# Patient Record
Sex: MALE | Race: BLACK OR AFRICAN AMERICAN | HISPANIC OR LATINO | Employment: FULL TIME | URBAN - METROPOLITAN AREA
[De-identification: names, ages, dates, MRNs, and addresses within clinical notes are randomized per-mention and may not be internally consistent; named-entity substitution may affect disease eponyms.]

---

## 2018-01-15 NOTE — MISCELLANEOUS
Provider Comments  Provider Comments:   3/30/16-TRIED CALLING PT  RE:MISSED APPT  TODAY   PHONE # IS NOT A WORKING # [phone]      Signatures   Electronically signed by : NOEL Arguello; Mar 30 2016  3:59PM EST                       (Author)    Electronically signed by : Krissy Diaz DO; Mar 30 2016  7:20PM EST                       (Author)

## 2018-06-06 ENCOUNTER — OFFICE VISIT (OUTPATIENT)
Dept: URGENT CARE | Facility: MEDICAL CENTER | Age: 23
End: 2018-06-06
Payer: COMMERCIAL

## 2018-06-06 VITALS
TEMPERATURE: 96.5 F | DIASTOLIC BLOOD PRESSURE: 80 MMHG | WEIGHT: 161 LBS | BODY MASS INDEX: 25.88 KG/M2 | HEART RATE: 75 BPM | RESPIRATION RATE: 18 BRPM | HEIGHT: 66 IN | OXYGEN SATURATION: 98 % | SYSTOLIC BLOOD PRESSURE: 119 MMHG

## 2018-06-06 DIAGNOSIS — J45.909 ASTHMA, UNSPECIFIED ASTHMA SEVERITY, UNSPECIFIED WHETHER COMPLICATED, UNSPECIFIED WHETHER PERSISTENT: Primary | ICD-10-CM

## 2018-06-06 PROCEDURE — S9088 SERVICES PROVIDED IN URGENT: HCPCS | Performed by: PHYSICIAN ASSISTANT

## 2018-06-06 PROCEDURE — 99203 OFFICE O/P NEW LOW 30 MIN: CPT | Performed by: PHYSICIAN ASSISTANT

## 2018-06-06 RX ORDER — ALBUTEROL SULFATE 90 UG/1
2 AEROSOL, METERED RESPIRATORY (INHALATION) EVERY 6 HOURS PRN
Qty: 18 G | Refills: 0 | Status: SHIPPED | OUTPATIENT
Start: 2018-06-06

## 2018-06-06 RX ORDER — LORATADINE 10 MG/1
10 CAPSULE, LIQUID FILLED ORAL
COMMUNITY

## 2018-06-06 RX ORDER — PREDNISONE 10 MG/1
TABLET ORAL
Qty: 21 TABLET | Refills: 0 | Status: SHIPPED | OUTPATIENT
Start: 2018-06-06

## 2018-06-06 RX ORDER — PREDNISONE 10 MG/1
TABLET ORAL
Qty: 21 TABLET | Refills: 0 | Status: SHIPPED | OUTPATIENT
Start: 2018-06-06 | End: 2018-06-06 | Stop reason: SDUPTHER

## 2018-06-06 RX ORDER — ALBUTEROL SULFATE 90 UG/1
2 AEROSOL, METERED RESPIRATORY (INHALATION) EVERY 6 HOURS PRN
Qty: 18 G | Refills: 0 | Status: SHIPPED | OUTPATIENT
Start: 2018-06-06 | End: 2018-06-06 | Stop reason: SDUPTHER

## 2018-06-06 RX ORDER — ALBUTEROL SULFATE 90 UG/1
2 AEROSOL, METERED RESPIRATORY (INHALATION) EVERY 6 HOURS PRN
COMMUNITY
End: 2018-06-06

## 2018-06-06 NOTE — PATIENT INSTRUCTIONS
Asthma   WHAT YOU NEED TO KNOW:   Asthma is a lung disease that makes breathing difficult  Chronic inflammation and reactions to triggers narrow the airways in the lungs  Asthma can become life-threatening if it is not managed  DISCHARGE INSTRUCTIONS:   Return to the emergency department if:   · You have severe shortness of breath  · Your lips or nails turn blue or gray  · The skin around your neck and ribs pulls in with each breath  · You have shortness of breath, even after you take your short-term medicine as directed  · Your peak flow numbers are in the red zone of your AAP  Contact your healthcare provider if:   · You run out of medicine before your next refill is due  · Your symptoms get worse  · You need to take more medicine than usual to control your symptoms  · You have questions or concerns about your condition or care  Medicines:   · Medicines  decrease inflammation, open airways, and make it easier to breathe  Medicines may be inhaled, taken as a pill, or injected  Short-term medicines relieve your symptoms quickly  Long-term medicines are used to prevent future attacks  You may also need medicine to help control your allergies  Ask your healthcare provider for more information about the medicine you are given and how to take it safely  · Take your medicine as directed  Contact your healthcare provider if you think your medicine is not helping or if you have side effects  Tell him of her if you are allergic to any medicine  Keep a list of the medicines, vitamins, and herbs you take  Include the amounts, and when and why you take them  Bring the list or the pill bottles to follow-up visits  Carry your medicine list with you in case of an emergency  Follow up with your healthcare provider as directed: You will need to return to make sure your medicine is working and your symptoms are controlled   You may be referred to an asthma specialist  Bright Lang may be asked to keep a record of your peak flow values and bring it with you to your appointments  Write down your questions so you remember to ask them during your visits  Manage your symptoms and prevent future attacks:   · Follow your Asthma Action Plan (AAP)  This is a written plan that you and your healthcare provider create  It explains which medicine you need and when to change doses if necessary  It also explains how you can monitor symptoms and use a peak flow meter  The meter measures how well your lungs are working  · Manage other health conditions , such as allergies, acid reflux, and sleep apnea  · Identify and avoid triggers  These may include pets, dust mites, mold, and cockroaches  · Do not smoke or be around others who smoke  Nicotine and other chemicals in cigarettes and cigars can cause lung damage  Ask your healthcare provider for information if you currently smoke and need help to quit  E-cigarettes or smokeless tobacco still contain nicotine  Talk to your healthcare provider before you use these products  · Ask about the flu vaccine  The flu can make your asthma worse  You may need a yearly flu shot  © 2017 [facility] Information is for End User's use only and may not be sold, redistributed or otherwise used for commercial purposes  All illustrations and images included in CareNotes® are the copyrighted property of [employer]  or [employer]  The above information is an  only  It is not intended as medical advice for individual conditions or treatments  Talk to your doctor, nurse or pharmacist before following any medical regimen to see if it is safe and effective for you

## 2018-06-06 NOTE — LETTER
June 6, 2018     Patient: Jorge Garcia   YOB: 1995   Date of Visit: 6/6/2018       To Whom It May Concern: It is my medical opinion that Jorge Garcia may return to work on 06/07/2018  If you have any questions or concerns, please don't hesitate to call           Sincerely,        Laura Jacobson PA-C    CC: No Recipients

## 2018-06-06 NOTE — PROGRESS NOTES
[facility]        NAME: Ashok Pinedo is a 20 y o  male  : 1995    MRN: [id number]  DATE: 2018  TIME: 9:11 AM    Assessment and Plan   Asthma, unspecified asthma severity, unspecified whether complicated, unspecified whether persistent [J45 909]  1  Asthma, unspecified asthma severity, unspecified whether complicated, unspecified whether persistent  predniSONE 10 mg tablet    albuterol (PROVENTIL HFA,VENTOLIN HFA) 90 mcg/act inhaler    DISCONTINUED: albuterol (PROVENTIL HFA,VENTOLIN HFA) 90 mcg/act inhaler    DISCONTINUED: predniSONE 10 mg tablet         Patient Instructions     Use inhaler and prednisone as directed  Patient may also take OTC antihistamine medications such as zyrtec, allegra, Claritin  Patient Instructions     Asthma   WHAT YOU NEED TO KNOW:   Asthma is a lung disease that makes breathing difficult  Chronic inflammation and reactions to triggers narrow the airways in the lungs  Asthma can become life-threatening if it is not managed  DISCHARGE INSTRUCTIONS:   Return to the emergency department if:   · You have severe shortness of breath  · Your lips or nails turn blue or gray  · The skin around your neck and ribs pulls in with each breath  · You have shortness of breath, even after you take your short-term medicine as directed  · Your peak flow numbers are in the red zone of your AAP  Contact your healthcare provider if:   · You run out of medicine before your next refill is due  · Your symptoms get worse  · You need to take more medicine than usual to control your symptoms  · You have questions or concerns about your condition or care  Medicines:   · Medicines  decrease inflammation, open airways, and make it easier to breathe  Medicines may be inhaled, taken as a pill, or injected  Short-term medicines relieve your symptoms quickly  Long-term medicines are used to prevent future attacks   You may also need medicine to help control your allergies  Ask your healthcare provider for more information about the medicine you are given and how to take it safely  · Take your medicine as directed  Contact your healthcare provider if you think your medicine is not helping or if you have side effects  Tell him of her if you are allergic to any medicine  Keep a list of the medicines, vitamins, and herbs you take  Include the amounts, and when and why you take them  Bring the list or the pill bottles to follow-up visits  Carry your medicine list with you in case of an emergency  Follow up with your healthcare provider as directed: You will need to return to make sure your medicine is working and your symptoms are controlled  You may be referred to an asthma specialist  Erica Kelly may be asked to keep a record of your peak flow values and bring it with you to your appointments  Write down your questions so you remember to ask them during your visits  Manage your symptoms and prevent future attacks:   · Follow your Asthma Action Plan (AAP)  This is a written plan that you and your healthcare provider create  It explains which medicine you need and when to change doses if necessary  It also explains how you can monitor symptoms and use a peak flow meter  The meter measures how well your lungs are working  · Manage other health conditions , such as allergies, acid reflux, and sleep apnea  · Identify and avoid triggers  These may include pets, dust mites, mold, and cockroaches  · Do not smoke or be around others who smoke  Nicotine and other chemicals in cigarettes and cigars can cause lung damage  Ask your healthcare provider for information if you currently smoke and need help to quit  E-cigarettes or smokeless tobacco still contain nicotine  Talk to your healthcare provider before you use these products  · Ask about the flu vaccine  The flu can make your asthma worse  You may need a yearly flu shot    © 2017 [employer] Information is for End User's use only and may not be sold, redistributed or otherwise used for commercial purposes  All illustrations and images included in CareNotes® are the copyrighted property of [employer]  or [employer]  The above information is an  only  It is not intended as medical advice for individual conditions or treatments  Talk to your doctor, nurse or pharmacist before following any medical regimen to see if it is safe and effective for you  Chief Complaint     Chief Complaint   Patient presents with    Cough     Patient relates started with wheezing x2 days  + cough and congestion  Denies fever  Hx  of asthma  Using Ventolin inhaler ran out last night  History of Present Illness   Ashok Pinedo presents to the clinic c/o       68-year-old male presents for evaluation of his asthma exacerbation since yesterday  Patient states he only has asthma exacerbations with seasonal changes which usually  Uses an asthma rescue inhaler  Only uses 1-2 X a year when asthma is bad with a change of season  He used it 6 x inhaler yesterday, last dose around 9 PM  This am his non productive cough and wheezing were still there  He no longer has a has been held because it ran out  He denies any respiratory distress syndromes  Denies any fever or chills  Review of Systems   Review of Systems   Constitutional: Negative for fever  Respiratory: Positive for cough, shortness of breath and wheezing  Cardiovascular: Negative            Current Medications     Long-Term Prescriptions   Medication Sig Dispense Refill    Loratadine 10 MG CAPS Take 10 mg by mouth         Current Allergies     Allergies as of 06/06/2018    (No Known Allergies)            The following portions of the patient's history were reviewed and updated as appropriate: allergies, current medications, past family history, past medical history, past social history, past surgical history and problem list     Objective   /80   Pulse 75   Temp (!) 96 5 °F (35 8 °C) (Tympanic)   Resp 18   Ht 5' 6" (1 676 m)   Wt 73 kg (161 lb)   SpO2 98%   BMI 25 99 kg/m²        Physical Exam     Physical Exam   Constitutional: He appears well-developed and well-nourished  No distress  Eyes: Conjunctivae and EOM are normal  Pupils are equal, round, and reactive to light  Right eye exhibits no discharge  Left eye exhibits no discharge  Neck: Normal range of motion  Neck supple  No tracheal deviation present  No thyromegaly present  Cardiovascular: Normal rate, regular rhythm and normal heart sounds  Exam reveals no gallop and no friction rub  No murmur heard  Pulmonary/Chest: Effort normal  No respiratory distress  He has wheezes (b/l wheezing diffusly heard on inspiration)  He has no rales  Musculoskeletal: Normal range of motion  Lymphadenopathy:     He has no cervical adenopathy  Skin: He is not diaphoretic  Nursing note and vitals reviewed

## 2018-08-30 ENCOUNTER — TRANSCRIBE ORDERS (OUTPATIENT)
Dept: URGENT CARE | Facility: MEDICAL CENTER | Age: 23
End: 2018-08-30

## 2018-08-30 ENCOUNTER — OFFICE VISIT (OUTPATIENT)
Dept: URGENT CARE | Facility: MEDICAL CENTER | Age: 23
End: 2018-08-30
Payer: COMMERCIAL

## 2018-08-30 ENCOUNTER — APPOINTMENT (OUTPATIENT)
Dept: RADIOLOGY | Facility: MEDICAL CENTER | Age: 23
End: 2018-08-30
Payer: COMMERCIAL

## 2018-08-30 VITALS
HEIGHT: 70 IN | SYSTOLIC BLOOD PRESSURE: 115 MMHG | TEMPERATURE: 97.4 F | HEART RATE: 72 BPM | BODY MASS INDEX: 22.19 KG/M2 | DIASTOLIC BLOOD PRESSURE: 65 MMHG | WEIGHT: 155 LBS | RESPIRATION RATE: 18 BRPM | OXYGEN SATURATION: 99 %

## 2018-08-30 DIAGNOSIS — M25.511 CHRONIC RIGHT SHOULDER PAIN: ICD-10-CM

## 2018-08-30 DIAGNOSIS — G89.29 CHRONIC RIGHT SHOULDER PAIN: Primary | ICD-10-CM

## 2018-08-30 DIAGNOSIS — M25.512 CHRONIC LEFT SHOULDER PAIN: ICD-10-CM

## 2018-08-30 DIAGNOSIS — G89.29 CHRONIC RIGHT SHOULDER PAIN: ICD-10-CM

## 2018-08-30 DIAGNOSIS — G89.29 CHRONIC LEFT SHOULDER PAIN: ICD-10-CM

## 2018-08-30 DIAGNOSIS — M25.511 CHRONIC RIGHT SHOULDER PAIN: Primary | ICD-10-CM

## 2018-08-30 PROCEDURE — S9088 SERVICES PROVIDED IN URGENT: HCPCS | Performed by: FAMILY MEDICINE

## 2018-08-30 PROCEDURE — 99213 OFFICE O/P EST LOW 20 MIN: CPT | Performed by: FAMILY MEDICINE

## 2018-08-30 PROCEDURE — 73030 X-RAY EXAM OF SHOULDER: CPT

## 2018-08-30 NOTE — PATIENT INSTRUCTIONS
X-ray of right and left shoulder reveals no fracture subluxation  History and physical suggest impingement of left shoulder greater than right  Patient advised to apply heat to affected area, continue with Advil as needed, he is to perform light stretching exercises for bilateral shoulder  Patient also given a list of primary care provider can look for to get established  Rotator Cuff Injury   WHAT YOU NEED TO KNOW:   A rotator cuff injury is damage to the muscles or tendons of your rotator cuff  The rotator cuff is made up of a group of muscles and tendons that hold the shoulder joint in place  The damage may include stretching of your muscle or tears in the tendons  It may also include inflammation of the bursa (small sack of fluid around the joint)  DISCHARGE INSTRUCTIONS:   · Acetaminophen: This medicine decreases pain  Acetaminophen is available without a doctor's order  Ask how much to take and how often to take it  Follow directions  Acetaminophen can cause liver damage if not taken correctly  · NSAIDs:  These medicines decrease swelling, pain, and fever  NSAIDs are available without a doctor's order  Ask your healthcare provider which medicine is right for you  Ask how much to take and when to take it  Take as directed  NSAIDs can cause stomach bleeding or kidney problems if not taken correctly  · Pain medicine: You may be given a prescription medicine to decrease pain  Do not wait until the pain is severe before you take this medicine  · Steroids: This medicine may be injected into the rotator cuff area to decrease inflammation and pain  · Take your medicine as directed  Contact your healthcare provider if you think your medicine is not helping or if you have side effects  Tell him of her if you are allergic to any medicine  Keep a list of the medicines, vitamins, and herbs you take  Include the amounts, and when and why you take them   Bring the list or the pill bottles to follow-up visits  Carry your medicine list with you in case of an emergency  Follow up with your healthcare provider or orthopedist as directed:  Write down your questions so you remember to ask them during your visits  Physical therapy:  A physical therapist can teach you exercises to help improve movement and strength, and to decrease pain  These exercises may help you go back to your usual activities or return to playing sports  Self-care:   · Rest:  Rest may help your shoulder heal  Overuse of your shoulder can make your injury worse  Avoid heavy lifting, using your arms over your head, or any other activity that makes the pain worse  · Put ice or heat on your shoulder:  Use ice on your shoulder every few hours for the first several days  This may help decrease pain and swelling  After the first several days, a heating pad may help relax the muscles in your shoulder  Contact your healthcare provider or orthopedist if:   · The pain in your shoulder or arm is not improving, or is worse than before you started treatment  · You have new pain in your neck  · You have a fever  · You have questions or concerns about your condition or care  Return to the emergency department if:  · You suddenly cannot move your arm  · You have severe stomach or back pain, are vomiting blood, or have black bowel movements  © 2017 [facility] Information is for End User's use only and may not be sold, redistributed or otherwise used for commercial purposes  All illustrations and images included in CareNotes® are the copyrighted property of [employer]  or [employer]  The above information is an  only  It is not intended as medical advice for individual conditions or treatments  Talk to your doctor, nurse or pharmacist before following any medical regimen to see if it is safe and effective for you

## 2018-08-30 NOTE — LETTER
August 30, 2018     Patient: Rosey Feliz   YOB: 1995   Date of Visit: 8/30/2018       To Whom It May Concern: It is my medical opinion that Rosey Feliz may return to work on 8/31/2018  If you have any questions or concerns, please don't hesitate to call           Sincerely,        Conchis Crenshaw MD    CC: No Recipients
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
## 2019-10-27 ENCOUNTER — APPOINTMENT (EMERGENCY)
Dept: RADIOLOGY | Facility: HOSPITAL | Age: 24
End: 2019-10-27
Payer: MEDICARE

## 2019-10-27 ENCOUNTER — HOSPITAL ENCOUNTER (EMERGENCY)
Facility: HOSPITAL | Age: 24
Discharge: HOME | End: 2019-10-27
Attending: EMERGENCY MEDICINE
Payer: MEDICARE

## 2019-10-27 VITALS
RESPIRATION RATE: 18 BRPM | OXYGEN SATURATION: 98 % | HEART RATE: 93 BPM | TEMPERATURE: 98.8 F | SYSTOLIC BLOOD PRESSURE: 149 MMHG | WEIGHT: 187 LBS | DIASTOLIC BLOOD PRESSURE: 85 MMHG | BODY MASS INDEX: 26.83 KG/M2

## 2019-10-27 DIAGNOSIS — S62.306A UNSPECIFIED FRACTURE OF FIFTH METACARPAL BONE, RIGHT HAND, INITIAL ENCOUNTER FOR CLOSED FRACTURE: Primary | ICD-10-CM

## 2019-10-27 PROCEDURE — 73140 X-RAY EXAM OF FINGER(S): CPT

## 2019-10-27 PROCEDURE — 99284 EMERGENCY DEPT VISIT MOD MDM: CPT | Performed by: EMERGENCY MEDICINE

## 2019-10-27 PROCEDURE — 99283 EMERGENCY DEPT VISIT LOW MDM: CPT

## 2019-10-28 NOTE — ED PROVIDER NOTES
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 Taking? Loratadine 10 MG CAPS   Yes No   Sig: Take 10 mg by mouth   albuterol (PROVENTIL HFA,VENTOLIN HFA) 90 mcg/act inhaler   No No   Sig: Inhale 2 puffs every 6 (six) hours as needed for wheezing or shortness of breath   predniSONE 10 mg tablet   No No   Sig: Day 1: take 6; day 2: take 5; day 3: take 4; day 4: take 3; day 5: take 2; Day 6: take 1 with food      Facility-Administered Medications: None       Past Medical History:   Diagnosis Date    Asthma        History reviewed  No pertinent surgical history  History reviewed  No pertinent family history  I have reviewed and agree with the history as documented  Social History     Tobacco Use    Smoking status: Never Smoker    Smokeless tobacco: Never Used   Substance Use Topics    Alcohol use: No    Drug use: No        Review of Systems   Constitutional: Negative for chills and fever  HENT: Negative for congestion and sore throat  Eyes: Negative for visual disturbance  Respiratory: Negative for cough and shortness of breath  Cardiovascular: Negative for chest pain and palpitations     Gastrointestinal: Negative for abdominal pain, diarrhea, nausea and vomiting  Genitourinary: Negative for difficulty urinating and dysuria  Musculoskeletal: Positive for arthralgias and joint swelling  Negative for myalgias  Skin: Negative for rash  Neurological: Negative for weakness, light-headedness, numbness and headaches  Physical Exam  ED Triage Vitals [10/27/19 1947]   Temperature Pulse Respirations Blood Pressure SpO2   98 8 °F (37 1 °C) 93 18 149/85 98 %      Temp Source Heart Rate Source Patient Position - Orthostatic VS BP Location FiO2 (%)   Oral Monitor Sitting Right arm --      Pain Score       --             Orthostatic Vital Signs  Vitals:    10/27/19 1947   BP: 149/85   Pulse: 93   Patient Position - Orthostatic VS: Sitting       Physical Exam   Constitutional: He is oriented to person, place, and time  He appears well-developed and well-nourished  HENT:   Head: Normocephalic and atraumatic  Mouth/Throat: Oropharynx is clear and moist    Eyes: Pupils are equal, round, and reactive to light  EOM are normal    Neck: Normal range of motion  Neck supple  Cardiovascular: Normal rate, regular rhythm and intact distal pulses  Pulmonary/Chest: No respiratory distress  Musculoskeletal: Normal range of motion  He exhibits no tenderness or deformity  Firm, nontender, non mobile nodule at the MCP of the right pinky finger  Normal flexion and extension at the joint to the pinky finger  Normal flexion extension at the wrist   No overlying erythema  Neurological: He is alert and oriented to person, place, and time  Coordination normal    Skin: Skin is warm and dry  Psychiatric: He has a normal mood and affect  His behavior is normal    Nursing note and vitals reviewed        ED Medications  Medications - No data to display    Diagnostic Studies  Results Reviewed     None                 XR finger fifth digit-pinkie LEFT    (Results Pending)         Procedures  Splint application  Date/Time: 10/27/2019 9:16 PM  Performed by: Ignacio Kwok MD  Authorized by: Ignacio Kwok MD     Patient location:  ED  Consent:     Consent obtained:  Verbal    Consent given by:  Patient    Risks discussed:  Discoloration, numbness, pain and swelling    Alternatives discussed:  No treatment and alternative treatment  Universal protocol:     Procedure explained and questions answered to patient or proxy's satisfaction: yes      Relevant documents present and verified: yes      Test results available and properly labeled: yes      Radiology Images displayed and confirmed  If images not available, report reviewed: yes      Required blood products, implants, devices, and special equipment available: yes      Site/side marked: yes      Immediately prior to procedure a time out was called: yes      Patient identity confirmed:  Verbally with patient  Indication:     Indications: fracture    Pre-procedure details:     Sensation:  Normal  Procedure details:     Laterality:  Right    Location:  Finger    Finger:  R small finger    Supplies:  Aluminum splint  Post-procedure details:     Pain:  Unchanged    Sensation:  Normal    Neurovascular Exam: skin pink, capillary refill <2 sec, normal pulses and skin intact, warm, and dry      Patient tolerance of procedure: Tolerated well, no immediate complications            ED Course                               MDM  Number of Diagnoses or Management Options  Diagnosis management comments: 57-year-old male presenting emergency department for evaluation of a nodule at the MCP of the right pinky finger  Appears to have a small chip fracture on x-ray in the emergency room  Unknown etiologies he denies any history of trauma  Very mild symptoms with intact range of motion and strength  Placed in an aluminum splint in the ER  Recommend hand surgery follow-up        Disposition  Final diagnoses:   Unspecified fracture of fifth metacarpal bone, right hand, initial encounter for closed fracture     Time reflects when diagnosis was documented in both MDM as applicable and the Disposition within this note     Time User Action Codes Description Comment    10/27/2019  9:18 PM Florin Joanne Jerzy Add [W25 [id number]] Unspecified fracture of fifth metacarpal bone, right hand, initial encounter for closed fracture       ED Disposition     ED Disposition Condition Date/Time Comment    Discharge Stable Sun Oct 27, 2019  9:14 PM Mirna Pacheco discharge to home/self care  Follow-up Information     Follow up With Specialties Details Why Contact Info Additional Information     [facility]   [address] [id number]  [phone] [address] [place] [address], [address] [place] [address] I , [place], [place], [address]          Discharge Medication List as of 10/27/2019  9:19 PM      CONTINUE these medications which have NOT CHANGED    Details   albuterol (PROVENTIL HFA,VENTOLIN HFA) 90 mcg/act inhaler Inhale 2 puffs every 6 (six) hours as needed for wheezing or shortness of breath, Starting Wed 6/6/2018, Normal      Loratadine 10 MG CAPS Take 10 mg by mouth, Historical Med      predniSONE 10 mg tablet Day 1: take 6; day 2: take 5; day 3: take 4; day 4: take 3; day 5: take 2; Day 6: take 1 with food, Normal           No discharge procedures on file  ED Provider  Attending physically available and evaluated Mirna Pacheco I managed the patient along with the ED Attending      Electronically Signed by         Gene Mcdaniel MD  10/28/19 0002

## 2019-10-28 NOTE — ED ATTENDING ATTESTATION
10/27/2019  I, Sandra Reyna MD, saw and evaluated the patient  I have discussed the patient with the resident/non-physician practitioner and agree with the resident's/non-physician practitioner's findings, Plan of Care, and MDM as documented in the resident's/non-physician practitioner's note, except where noted  All available labs and Radiology studies were reviewed  I was present for key portions of any procedure(s) performed by the resident/non-physician practitioner and I was immediately available to provide assistance  At this point I agree with the current assessment done in the Emergency Department  I have conducted an independent evaluation of this patient a history and physical is as follows:    ED Course         Critical Care Time  Procedures    29 yo male with right 5th digit metacarpal pain and swelling for last three weeks  No trauma, but pt is a  and uses hands a lot  No numbness, tingling, weakness  No pmh  Vss, afebrile, lungs cta, rrr, right 5th metacarpal with swelling, tenderness, nvi  Xray with small chip fx  Pt splinted, hand follow up

## 2020-02-21 ENCOUNTER — APPOINTMENT (EMERGENCY)
Dept: RADIOLOGY | Facility: HOSPITAL | Age: 25
End: 2020-02-21
Payer: MEDICARE

## 2020-02-21 ENCOUNTER — HOSPITAL ENCOUNTER (EMERGENCY)
Facility: HOSPITAL | Age: 25
Discharge: HOME | End: 2020-02-21
Attending: EMERGENCY MEDICINE | Admitting: EMERGENCY MEDICINE
Payer: MEDICARE

## 2020-02-21 VITALS
HEART RATE: 90 BPM | HEIGHT: 70 IN | DIASTOLIC BLOOD PRESSURE: 76 MMHG | WEIGHT: 182 LBS | BODY MASS INDEX: 26.05 KG/M2 | TEMPERATURE: 97.7 F | OXYGEN SATURATION: 100 % | SYSTOLIC BLOOD PRESSURE: 128 MMHG | RESPIRATION RATE: 18 BRPM

## 2020-02-21 DIAGNOSIS — S29.019A THORACIC MYOFASCIAL STRAIN, INITIAL ENCOUNTER: Primary | ICD-10-CM

## 2020-02-21 DIAGNOSIS — S39.012A ACUTE MYOFASCIAL STRAIN OF LUMBOSACRAL REGION, INITIAL ENCOUNTER: ICD-10-CM

## 2020-02-21 PROCEDURE — 72100 X-RAY EXAM L-S SPINE 2/3 VWS: CPT

## 2020-02-21 PROCEDURE — 72070 X-RAY EXAM THORAC SPINE 2VWS: CPT

## 2020-02-21 PROCEDURE — 99284 EMERGENCY DEPT VISIT MOD MDM: CPT | Performed by: PHYSICIAN ASSISTANT

## 2020-02-21 PROCEDURE — 99283 EMERGENCY DEPT VISIT LOW MDM: CPT

## 2020-02-21 RX ORDER — CYCLOBENZAPRINE HCL 10 MG
10 TABLET ORAL ONCE
Status: COMPLETED | OUTPATIENT
Start: 2020-02-21 | End: 2020-02-21

## 2020-02-21 RX ORDER — IBUPROFEN 400 MG/1
400 TABLET ORAL ONCE
Status: COMPLETED | OUTPATIENT
Start: 2020-02-21 | End: 2020-02-21

## 2020-02-21 RX ORDER — CYCLOBENZAPRINE HCL 10 MG
10 TABLET ORAL 3 TIMES DAILY PRN
Qty: 9 TABLET | Refills: 0 | Status: SHIPPED | OUTPATIENT
Start: 2020-02-21 | End: 2020-03-01

## 2020-02-21 RX ORDER — IBUPROFEN 400 MG/1
400 TABLET ORAL EVERY 6 HOURS PRN
Qty: 15 TABLET | Refills: 0 | Status: SHIPPED | OUTPATIENT
Start: 2020-02-21

## 2020-02-21 RX ADMIN — IBUPROFEN 400 MG: 400 TABLET ORAL at 20:58

## 2020-02-21 RX ADMIN — CYCLOBENZAPRINE 10 MG: 10 TABLET, FILM COATED ORAL at 20:58
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
## 2020-02-22 NOTE — ED NOTES
Patient transported to [address] [place] [address], [address] [place] [address]  02/21/20 2026

## 2020-10-11 ENCOUNTER — OFFICE VISIT (OUTPATIENT)
Dept: URGENT CARE | Age: 25
End: 2020-10-11
Payer: MEDICARE

## 2020-10-11 VITALS
DIASTOLIC BLOOD PRESSURE: 60 MMHG | TEMPERATURE: 99.7 F | HEART RATE: 106 BPM | WEIGHT: 151.8 LBS | OXYGEN SATURATION: 95 % | HEIGHT: 70 IN | SYSTOLIC BLOOD PRESSURE: 111 MMHG | BODY MASS INDEX: 21.73 KG/M2 | RESPIRATION RATE: 20 BRPM

## 2020-10-11 DIAGNOSIS — J45.901 ASTHMA WITH ACUTE EXACERBATION, UNSPECIFIED ASTHMA SEVERITY, UNSPECIFIED WHETHER PERSISTENT: Primary | ICD-10-CM

## 2020-10-11 PROCEDURE — 99213 OFFICE O/P EST LOW 20 MIN: CPT | Performed by: NURSE PRACTITIONER

## 2020-10-11 PROCEDURE — 96372 THER/PROPH/DIAG INJ SC/IM: CPT | Performed by: NURSE PRACTITIONER

## 2020-10-11 RX ORDER — PREDNISONE 20 MG/1
TABLET ORAL
Qty: 10 TABLET | Refills: 0 | Status: SHIPPED | OUTPATIENT
Start: 2020-10-11

## 2020-10-11 RX ORDER — ALBUTEROL SULFATE 90 UG/1
2 AEROSOL, METERED RESPIRATORY (INHALATION) EVERY 6 HOURS PRN
Qty: 18 G | Refills: 0 | Status: SHIPPED | OUTPATIENT
Start: 2020-10-11

## 2020-10-11 RX ADMIN — Medication 10 MG: at 13:42

## 2022-12-30 ENCOUNTER — HOSPITAL ENCOUNTER (EMERGENCY)
Facility: HOSPITAL | Age: 27
End: 2022-12-31
Attending: EMERGENCY MEDICINE

## 2022-12-30 VITALS
TEMPERATURE: 98.3 F | HEART RATE: 85 BPM | OXYGEN SATURATION: 98 % | SYSTOLIC BLOOD PRESSURE: 124 MMHG | DIASTOLIC BLOOD PRESSURE: 69 MMHG | RESPIRATION RATE: 20 BRPM

## 2022-12-30 DIAGNOSIS — F32.A DEPRESSION: ICD-10-CM

## 2022-12-30 DIAGNOSIS — F32.A DEPRESSION, UNSPECIFIED DEPRESSION TYPE: Primary | ICD-10-CM

## 2022-12-30 DIAGNOSIS — F22 PARANOIA (HCC): Primary | ICD-10-CM

## 2022-12-30 LAB
AMPHETAMINES SERPL QL SCN: POSITIVE
BARBITURATES UR QL: NEGATIVE
BENZODIAZ UR QL: NEGATIVE
COCAINE UR QL: NEGATIVE
ETHANOL EXG-MCNC: 0 MG/DL
FLUAV RNA RESP QL NAA+PROBE: NEGATIVE
FLUBV RNA RESP QL NAA+PROBE: NEGATIVE
METHADONE UR QL: NEGATIVE
OPIATES UR QL SCN: NEGATIVE
OXYCODONE+OXYMORPHONE UR QL SCN: NEGATIVE
PCP UR QL: NEGATIVE
RSV RNA RESP QL NAA+PROBE: NEGATIVE
SARS-COV-2 RNA RESP QL NAA+PROBE: NEGATIVE
THC UR QL: POSITIVE

## 2022-12-30 RX ORDER — OLANZAPINE 2.5 MG/1
2.5 TABLET ORAL
Status: CANCELLED | OUTPATIENT
Start: 2022-12-30

## 2022-12-30 RX ORDER — IBUPROFEN 400 MG/1
400 TABLET ORAL EVERY 6 HOURS PRN
Status: CANCELLED | OUTPATIENT
Start: 2022-12-30

## 2022-12-30 RX ORDER — PROPRANOLOL HYDROCHLORIDE 10 MG/1
10 TABLET ORAL EVERY 8 HOURS PRN
Status: CANCELLED | OUTPATIENT
Start: 2022-12-30

## 2022-12-30 RX ORDER — LANOLIN ALCOHOL/MO/W.PET/CERES
3 CREAM (GRAM) TOPICAL
Status: CANCELLED | OUTPATIENT
Start: 2022-12-30

## 2022-12-30 RX ORDER — IBUPROFEN 600 MG/1
600 TABLET ORAL EVERY 8 HOURS PRN
Status: CANCELLED | OUTPATIENT
Start: 2022-12-30

## 2022-12-30 RX ORDER — MAGNESIUM HYDROXIDE/ALUMINUM HYDROXICE/SIMETHICONE 120; 1200; 1200 MG/30ML; MG/30ML; MG/30ML
30 SUSPENSION ORAL EVERY 4 HOURS PRN
Status: CANCELLED | OUTPATIENT
Start: 2022-12-30

## 2022-12-30 RX ORDER — BISACODYL 10 MG
10 SUPPOSITORY, RECTAL RECTAL DAILY PRN
Status: CANCELLED | OUTPATIENT
Start: 2022-12-30

## 2022-12-30 RX ORDER — OLANZAPINE 10 MG/1
10 INJECTION, POWDER, LYOPHILIZED, FOR SOLUTION INTRAMUSCULAR
Status: CANCELLED | OUTPATIENT
Start: 2022-12-30

## 2022-12-30 RX ORDER — AMOXICILLIN 250 MG
1 CAPSULE ORAL DAILY PRN
Status: CANCELLED | OUTPATIENT
Start: 2022-12-30

## 2022-12-30 RX ORDER — HYDROXYZINE 50 MG/1
50 TABLET, FILM COATED ORAL
Status: CANCELLED | OUTPATIENT
Start: 2022-12-30

## 2022-12-30 RX ORDER — MIRTAZAPINE 15 MG/1
7.5 TABLET, FILM COATED ORAL
Status: CANCELLED | OUTPATIENT
Start: 2022-12-30

## 2022-12-30 RX ORDER — OLANZAPINE 10 MG/1
5 INJECTION, POWDER, LYOPHILIZED, FOR SOLUTION INTRAMUSCULAR
Status: CANCELLED | OUTPATIENT
Start: 2022-12-30

## 2022-12-30 RX ORDER — BENZTROPINE MESYLATE 1 MG/ML
1 INJECTION INTRAMUSCULAR; INTRAVENOUS
Status: CANCELLED | OUTPATIENT
Start: 2022-12-30

## 2022-12-30 RX ORDER — ACETAMINOPHEN 325 MG/1
650 TABLET ORAL EVERY 6 HOURS PRN
Status: CANCELLED | OUTPATIENT
Start: 2022-12-30

## 2022-12-30 RX ORDER — HYDROXYZINE HYDROCHLORIDE 25 MG/1
25 TABLET, FILM COATED ORAL
Status: CANCELLED | OUTPATIENT
Start: 2022-12-30

## 2022-12-30 RX ORDER — BENZTROPINE MESYLATE 1 MG/1
1 TABLET ORAL
Status: CANCELLED | OUTPATIENT
Start: 2022-12-30

## 2022-12-30 RX ORDER — HYDROXYZINE 50 MG/1
100 TABLET, FILM COATED ORAL
Status: CANCELLED | OUTPATIENT
Start: 2022-12-30

## 2022-12-30 RX ORDER — OLANZAPINE 5 MG/1
5 TABLET ORAL
Status: CANCELLED | OUTPATIENT
Start: 2022-12-30

## 2022-12-30 RX ORDER — POLYETHYLENE GLYCOL 3350 17 G/17G
17 POWDER, FOR SOLUTION ORAL DAILY PRN
Status: CANCELLED | OUTPATIENT
Start: 2022-12-30

## 2022-12-30 RX ORDER — LORAZEPAM 2 MG/ML
2 INJECTION INTRAMUSCULAR EVERY 6 HOURS PRN
Status: CANCELLED | OUTPATIENT
Start: 2022-12-30

## 2022-12-30 RX ORDER — DIPHENHYDRAMINE HYDROCHLORIDE 50 MG/ML
50 INJECTION INTRAMUSCULAR; INTRAVENOUS EVERY 6 HOURS PRN
Status: CANCELLED | OUTPATIENT
Start: 2022-12-30

## 2022-12-30 RX ORDER — OLANZAPINE 10 MG/1
10 TABLET ORAL
Status: CANCELLED | OUTPATIENT
Start: 2022-12-30
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
## 2022-12-30 NOTE — LETTER
[address] [facility] EMERGENCY DEPARTMENT  [facility] [address]  Dept: [phone]              EMTALA TRANSFER CONSENT    NAME Kia CASH 1995                              MRN [id number]    I have been informed of my rights regarding examination, treatment, and transfer   by Dr Lenore Fitzgerald,*    Benefits: Other benefits (Include comment)_______________________ (Inpt MH tx)    Risks: Potential for delay in receiving treatment      Consent for Transfer:  I acknowledge that my medical condition has been evaluated and explained to me by the emergency department physician or other qualified medical person and/or my attending physician, who has recommended that I be transferred to the service of  Accepting Physician: Dr Taqueria Pickens at [address] [facility] Name, [address] : [place], [facility]  The above potential benefits of such transfer, the potential risks associated with such transfer, and the probable risks of not being transferred have been explained to me, and I fully understand them  The doctor has explained that, in my case, the benefits of transfer outweigh the risks  I agree to be transferred  I authorize the performance of emergency medical procedures and treatments upon me in both transit and upon arrival at the receiving facility  Additionally, I authorize the release of any and all medical records to the receiving facility and request they be transported with me, if possible  I understand that the safest mode of transportation during a medical emergency is an ambulance and that the Hospital advocates the use of this mode of transport  Risks of traveling to the receiving facility by car, including absence of medical control, life sustaining equipment, such as oxygen, and medical personnel has been explained to me and I fully understand them      (BILLY CORRECT BOX BELOW)  [ X ]  I consent to the stated transfer and to be transported by ambulance/helicopter  [  ]  I consent to the stated transfer, but refuse transportation by ambulance and accept full responsibility for my transportation by car  I understand the risks of non-ambulance transfers and I exonerate the Hospital and its staff from any deterioration in my condition that results from this refusal     X___________________________________________    DATE  22  TIME________  Signature of patient or legally responsible individual signing on patient behalf           RELATIONSHIP TO PATIENT_________________________                      Provider Certification    NAME Beltran Diop                            1995                              MRN [id number]    A medical screening exam was performed on the above named patient  Based on the examination:    Condition Necessitating Transfer The primary encounter diagnosis was Paranoia ([address] )  A diagnosis of Depression was also pertinent to this visit  Patient Condition: The patient has been stabilized such that within reasonable medical probability, no material deterioration of the patient condition or the condition of the unborn child(elizabeth) is likely to result from the transfer    Reason for Transfer: Level of Care needed not available at this facility    Transfer Requirements: [facility] -[facility]    · Space available and qualified personnel available for treatment as acknowledged by ALFONZO Chisholm  · Agreed to accept transfer and to provide appropriate medical treatment as acknowledged by       Dr Bozena Duque  · Appropriate medical records of the examination and treatment of the patient are provided at the time of transfer   [facility], [address] ___DC____  · Transfer will be performed by qualified personnel from [address] [place] [address]  and appropriate transfer equipment as required, including the use of necessary and appropriate life support measures      Provider Certification: I have examined the patient and explained the following risks and benefits of being transferred/refusing transfer to the patient/family:  General risk, such as traffic hazards, adverse weather conditions, rough terrain or turbulence, possible failure of equipment (including vehicle or aircraft), or consequences of actions of persons outside the control of the transport personnel      Based on these reasonable risks and benefits to the patient and/or the unborn child(elizabeth), and based upon the information available at the time of the patient’s examination, I certify that the medical benefits reasonably to be expected from the provision of appropriate medical treatments at another medical facility outweigh the increasing risks, if any, to the individual’s medical condition, and in the case of labor to the unborn child, from effecting the transfer      X____________________________________________ DATE 12/30/22        TIME_______      ORIGINAL - SEND TO MEDICAL RECORDS   COPY - SEND WITH PATIENT DURING TRANSFER
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
## 2022-12-30 NOTE — ED PROVIDER NOTES
History  Chief Complaint   Patient presents with   • Psychiatric Evaluation     Brought in by EMS with police who would like to 302 due to chasing a boy who he thought was following him  Denies SI/HI/AH/VH but does admit to thinking someone was following him     Patient is a 32year old male with PMHx asthma, depression, "stress", presenting to the ED with police for evaluation of paranoia  Per police at bedside, patient had flagged her down on the road, believing that someone was chasing and following him  At that moment, another car came behind the police and a man got out and confronted the patient saying "are you the one that has been chasing my kid?" Per police who spoke to patient's mother, patient believes that there are people following him and out to get him  He chased a 13year old boy in the streets today, which is when police came upon him  Police spoke to patient's mother and mother states that patient moved in with her 4 months ago after patient lost his house and his car  Patient reportedly has been having delusions and episodes of paranoia  Reported 30 lb unintentional weight loss over the past 4 months  Questionable drug use  Police had contacted crisis services prior to arrival and was instructed to petition for a 302  On my evaluation, patient admits to feeling stressed and depressed lately, but unwilling to disclose further  He is calm and cooperative, recognizes that he needs help and is willing to sign in for voluntary treatment  Patient has no somatic complaints at this time other than hunger  States he has not eaten today  Patient denies suicidal ideation, homicidal ideation, hallucinations, command voices  Prior to Admission Medications   Prescriptions Last Dose Informant Patient Reported? Taking?    Loratadine 10 MG CAPS   Yes No   Sig: Take 10 mg by mouth   albuterol (PROVENTIL HFA,VENTOLIN HFA) 90 mcg/act inhaler   No No   Sig: Inhale 2 puffs every 6 (six) hours as needed for wheezing or shortness of breath   albuterol (Ventolin HFA) 90 mcg/act inhaler   No No   Sig: Inhale 2 puffs every 6 (six) hours as needed for wheezing   cyclobenzaprine (FLEXERIL) 10 mg tablet   No No   Sig: Take 1 tablet (10 mg total) by mouth 3 (three) times a day as needed for muscle spasms for up to 9 days   ibuprofen (MOTRIN) 400 mg tablet   No No   Sig: Take 1 tablet (400 mg total) by mouth every 6 (six) hours as needed for mild pain for up to 15 doses   predniSONE 10 mg tablet   No No   Sig: Day 1: take 6; day 2: take 5; day 3: take 4; day 4: take 3; day 5: take 2; Day 6: take 1 with food   Patient not taking: Reported on 10/11/2020   predniSONE 20 mg tablet   No No   Si tab TID x 3 days, then BIX 3 3 days, then daily x 3 days      Facility-Administered Medications: None       Past Medical History:   Diagnosis Date   • Asthma        History reviewed  No pertinent surgical history  History reviewed  No pertinent family history  I have reviewed and agree with the history as documented  E-Cigarette/Vaping     E-Cigarette/Vaping Substances     Social History     Tobacco Use   • Smoking status: Never   • Smokeless tobacco: Never   Substance Use Topics   • Alcohol use: No   • Drug use: Yes     Types: Marijuana        Review of Systems   Constitutional: Positive for unexpected weight change  Negative for chills and fever  HENT: Negative for ear pain and sore throat  Eyes: Negative for pain and visual disturbance  Respiratory: Negative for cough and shortness of breath  Cardiovascular: Negative for chest pain and palpitations  Gastrointestinal: Negative for abdominal pain and vomiting  Genitourinary: Negative for dysuria and hematuria  Musculoskeletal: Negative for arthralgias and back pain  Skin: Negative for color change and rash  Neurological: Negative for seizures and syncope  Psychiatric/Behavioral: Negative for suicidal ideas     All other systems reviewed and are negative  Physical Exam  ED Triage Vitals [12/30/22 1553]   Temperature Pulse Respirations Blood Pressure SpO2   98 3 °F (36 8 °C) (!) 118 18 144/83 100 %      Temp Source Heart Rate Source Patient Position - Orthostatic VS BP Location FiO2 (%)   Oral Monitor -- Right arm --      Pain Score       --             Orthostatic Vital Signs  Vitals:    12/30/22 1553 12/30/22 2353   BP: 144/83 124/69   Pulse: (!) 118 85       Physical Exam  Vitals and nursing note reviewed  Constitutional:       General: He is not in acute distress  Appearance: Normal appearance  He is well-developed  HENT:      Head: Normocephalic and atraumatic  Right Ear: External ear normal       Left Ear: External ear normal       Nose: Nose normal  No congestion  Mouth/Throat:      Mouth: Mucous membranes are moist       Pharynx: Oropharynx is clear  No oropharyngeal exudate or posterior oropharyngeal erythema  Eyes:      General: No scleral icterus  Conjunctiva/sclera: Conjunctivae normal       Pupils: Pupils are equal, round, and reactive to light  Cardiovascular:      Rate and Rhythm: Normal rate and regular rhythm  Pulses: Normal pulses  Heart sounds: Normal heart sounds  No murmur heard  Pulmonary:      Effort: Pulmonary effort is normal  No respiratory distress  Breath sounds: Normal breath sounds  No wheezing, rhonchi or rales  Abdominal:      General: Abdomen is flat  Bowel sounds are normal       Palpations: Abdomen is soft  Tenderness: There is no abdominal tenderness  There is no guarding or rebound  Musculoskeletal:         General: No swelling  Normal range of motion  Cervical back: Normal range of motion and neck supple  No tenderness  Right lower leg: No edema  Left lower leg: No edema  Skin:     General: Skin is warm and dry  Capillary Refill: Capillary refill takes less than 2 seconds  Findings: No erythema or rash     Neurological:      General: No focal deficit present  Mental Status: He is alert and oriented to person, place, and time  Cranial Nerves: No cranial nerve deficit  Sensory: No sensory deficit  Motor: No weakness  Gait: Gait normal    Psychiatric:         Attention and Perception: Attention and perception normal          Mood and Affect: Mood normal  Mood is not anxious  Affect is flat  Speech: Speech normal          Behavior: Behavior is not agitated or aggressive  Thought Content: Thought content is paranoid (believes someone was chasing him earlier today)  Thought content does not include homicidal or suicidal ideation  Thought content does not include homicidal or suicidal plan  Cognition and Memory: Cognition and memory normal          ED Medications  Medications - No data to display    Diagnostic Studies  Results Reviewed     Procedure Component Value Units Date/Time    Rapid drug screen, urine [[id number]]  (Abnormal) Collected: 12/30/22 1852    Lab Status: Final result Specimen: Urine, Clean Catch Updated: 12/30/22 1917     Amph/Meth UR Positive     Barbiturate Ur Negative     Benzodiazepine Urine Negative     Cocaine Urine Negative     Methadone Urine Negative     Opiate Urine Negative     PCP Ur Negative     THC Urine Positive     Oxycodone Urine Negative    Narrative:      Presumptive report  If requested, specimen will be sent to reference lab for confirmation  FOR MEDICAL PURPOSES ONLY  IF CONFIRMATION NEEDED PLEASE CONTACT THE LAB WITHIN 5 DAYS      Drug Screen Cutoff Levels:  AMPHETAMINE/METHAMPHETAMINES  1000 ng/mL  BARBITURATES     200 ng/mL  BENZODIAZEPINES     200 ng/mL  COCAINE      300 ng/mL  METHADONE      300 ng/mL  OPIATES      300 ng/mL  PHENCYCLIDINE     25 ng/mL  THC       50 ng/mL  OXYCODONE      100 ng/mL    FLU/RSV/COVID - if FLU/RSV clinically relevant [[id number]]  (Normal) Collected: 12/30/22 1722    Lab Status: Final result Specimen: Nares from Nose Updated: 12/30/22 1827     SARS-CoV-2 Negative     INFLUENZA A PCR Negative     INFLUENZA B PCR Negative     RSV PCR Negative    Narrative:      FOR PEDIATRIC PATIENTS - copy/paste COVID Guidelines URL to browser: [email or web address]    SARS-CoV-2 assay is a Nucleic Acid Amplification assay intended for the  qualitative detection of nucleic acid from SARS-CoV-2 in nasopharyngeal  swabs  Results are for the presumptive identification of SARS-CoV-2 RNA  Positive results are indicative of infection with SARS-CoV-2, the virus  causing COVID-19, but do not rule out bacterial infection or co-infection  with other viruses  Laboratories within the [place] and its  territories are required to report all positive results to the appropriate  public health authorities  Negative results do not preclude SARS-CoV-2  infection and should not be used as the sole basis for treatment or other  patient management decisions  Negative results must be combined with  clinical observations, patient history, and epidemiological information  This test has not been FDA cleared or approved  This test has been authorized by FDA under an Emergency Use Authorization  (EUA)  This test is only authorized for the duration of time the  declaration that circumstances exist justifying the authorization of the  emergency use of an in vitro diagnostic tests for detection of SARS-CoV-2  virus and/or diagnosis of COVID-19 infection under section 564(b)(1) of  the Act, 21 U  S C  982DVQ-7(C)(5), unless the authorization is terminated  or revoked sooner  The test has been validated but independent review by FDA  and CLIA is pending  Test performed using [employer] GeneXpert: This RT-PCR assay targets N2,  a region unique to SARS-CoV-2  A conserved region in the E-gene was chosen  for pan-Sarbecovirus detection which includes SARS-CoV-2      According to CMS-2020-01-R, this platform meets the definition of high-throughput technology  POCT alcohol breath test [[id number]]  (Normal) Resulted: 12/30/22 1635    Lab Status: Final result Updated: 12/30/22 1635     EXTBreath Alcohol 0 000                 No orders to display         Procedures  Procedures      ED Course  ED Course as of 12/31/22 0033   Fri Dec 30, 2022   1800 Patient signed a 61 51 81 order for voluntary psychiatric admission  This is a 32year old male presenting to the ED with police for evaluation of paranoia after chasing a 13year old boy, believing that the boy was chasing him  Patient denies suicidal or homicidal ideation  ED crisis worker contacted and will evaluate   filling out 302 paperwork in case patient will not sign 201  On re-evaluation, patient resting comfortably  Calm and cooperative  Patient has been accepted to [facility] and transfer time estimated [phone]  Patient signed transfer consent  No other somatic complaints  MDM    Disposition  Final diagnoses:   Paranoia ([address] )   Depression     Time reflects when diagnosis was documented in both MDM as applicable and the Disposition within this note     Time User Action Codes Description Comment    12/30/2022  6:27 PM Jackie Gasmen Add [F22] Paranoia ([address] )     12/30/2022  6:27 PM Jackie Gasmen Add Beny [place]  A] Depression       ED Disposition     ED Disposition   Transfer to Behavioral Health    Condition   --    Date/Time   Fri Dec 30, 2022  6:27 PM    Comment   Edilia  has been medically cleared and can be transferred out to inpatient behavioral health             MD Documentation    [address] Most Recent Value   Patient Condition The patient has been stabilized such that within reasonable medical probability, no material deterioration of the patient condition or the condition of the unborn child(elizabeth) is likely to result from the transfer   Reason for Transfer Level of Care needed not available at this facility   Benefits of Transfer Other benefits (Include comment)_______________________  Rian [facility] MH tx]   Risks of Transfer Potential for delay in receiving treatment   Accepting Physician Dr Linn Bryant Name, [address], [place]    (Name & Tel number) Aramis Marshall, LSW   Transported by [employer] and Unit #) [address]   Sending MD Dr Leoncio Che   Provider Certification General risk, such as traffic hazards, adverse weather conditions, rough terrain or turbulence, possible failure of equipment (including vehicle or aircraft), or consequences of actions of persons outside the control of the transport personnel      RN Documentation    [address] Name, [address]  [place] [place]    (Name & Tel number) Aramis Marshall, EULOGIOW   Transported by [employer] and Unit #) [facility]      Follow-up Information    None         Discharge Medication List as of 12/31/2022 12:29 AM      CONTINUE these medications which have NOT CHANGED    Details   !! albuterol (PROVENTIL HFA,VENTOLIN HFA) 90 mcg/act inhaler Inhale 2 puffs every 6 (six) hours as needed for wheezing or shortness of breath, Starting Wed 6/6/2018, Normal      !! albuterol (Ventolin HFA) 90 mcg/act inhaler Inhale 2 puffs every 6 (six) hours as needed for wheezing, Starting Sun 10/11/2020, Normal      cyclobenzaprine (FLEXERIL) 10 mg tablet Take 1 tablet (10 mg total) by mouth 3 (three) times a day as needed for muscle spasms for up to 9 days, Starting Fri 2/21/2020, Until Sun 3/1/2020, Print      ibuprofen (MOTRIN) 400 mg tablet Take 1 tablet (400 mg total) by mouth every 6 (six) hours as needed for mild pain for up to 15 doses, Starting Fri 2/21/2020, Print      Loratadine 10 MG CAPS Take 10 mg by mouth, Historical Med      !! predniSONE 10 mg tablet Day 1: take 6; day 2: take 5; day 3: take 4; day 4: take 3; day 5: take 2; Day 6: take 1 with food, Normal      !! predniSONE 20 mg tablet 1 tab TID x 3 days, then BIX 3 3 days, then daily x 3 days, Normal       !! - Potential duplicate medications found  Please discuss with provider  No discharge procedures on file  PDMP Review     None           ED Provider  Attending physically available and evaluated Tete Harper I managed the patient along with the ED Attending      Electronically Signed by         Macario Webb DO  12/31/22 1056

## 2022-12-31 ENCOUNTER — HOSPITAL ENCOUNTER (INPATIENT)
Facility: HOSPITAL | Age: 27
LOS: 3 days | Discharge: HOME | End: 2023-01-03
Attending: STUDENT IN AN ORGANIZED HEALTH CARE EDUCATION/TRAINING PROGRAM | Admitting: GENERAL PRACTICE

## 2022-12-31 DIAGNOSIS — F32.A DEPRESSION, UNSPECIFIED DEPRESSION TYPE: ICD-10-CM

## 2022-12-31 PROBLEM — Z00.8 MEDICAL CLEARANCE FOR PSYCHIATRIC ADMISSION: Status: ACTIVE | Noted: 2022-12-31

## 2022-12-31 PROBLEM — F29 PSYCHOSIS (HCC): Status: ACTIVE | Noted: 2022-12-31

## 2022-12-31 RX ORDER — IBUPROFEN 400 MG/1
400 TABLET ORAL EVERY 6 HOURS PRN
Status: DISCONTINUED | OUTPATIENT
Start: 2022-12-31 | End: 2023-01-03 | Stop reason: HOSPADM

## 2022-12-31 RX ORDER — OLANZAPINE 5 MG/1
5 TABLET ORAL
Status: DISCONTINUED | OUTPATIENT
Start: 2022-12-31 | End: 2023-01-03 | Stop reason: HOSPADM

## 2022-12-31 RX ORDER — DIPHENHYDRAMINE HYDROCHLORIDE 50 MG/ML
50 INJECTION INTRAMUSCULAR; INTRAVENOUS EVERY 6 HOURS PRN
Status: DISCONTINUED | OUTPATIENT
Start: 2022-12-31 | End: 2023-01-03 | Stop reason: HOSPADM

## 2022-12-31 RX ORDER — POLYETHYLENE GLYCOL 3350 17 G/17G
17 POWDER, FOR SOLUTION ORAL DAILY PRN
Status: DISCONTINUED | OUTPATIENT
Start: 2022-12-31 | End: 2023-01-03 | Stop reason: HOSPADM

## 2022-12-31 RX ORDER — OLANZAPINE 2.5 MG/1
2.5 TABLET ORAL
Status: DISCONTINUED | OUTPATIENT
Start: 2022-12-31 | End: 2023-01-03 | Stop reason: HOSPADM

## 2022-12-31 RX ORDER — BENZTROPINE MESYLATE 1 MG/1
1 TABLET ORAL
Status: DISCONTINUED | OUTPATIENT
Start: 2022-12-31 | End: 2023-01-03 | Stop reason: HOSPADM

## 2022-12-31 RX ORDER — BENZTROPINE MESYLATE 1 MG/ML
1 INJECTION INTRAMUSCULAR; INTRAVENOUS
Status: DISCONTINUED | OUTPATIENT
Start: 2022-12-31 | End: 2023-01-03 | Stop reason: HOSPADM

## 2022-12-31 RX ORDER — LORAZEPAM 2 MG/ML
2 INJECTION INTRAMUSCULAR EVERY 6 HOURS PRN
Status: DISCONTINUED | OUTPATIENT
Start: 2022-12-31 | End: 2023-01-03 | Stop reason: HOSPADM

## 2022-12-31 RX ORDER — BISACODYL 10 MG
10 SUPPOSITORY, RECTAL RECTAL DAILY PRN
Status: DISCONTINUED | OUTPATIENT
Start: 2022-12-31 | End: 2022-12-31

## 2022-12-31 RX ORDER — LANOLIN ALCOHOL/MO/W.PET/CERES
3 CREAM (GRAM) TOPICAL
Status: DISCONTINUED | OUTPATIENT
Start: 2022-12-31 | End: 2023-01-03 | Stop reason: HOSPADM

## 2022-12-31 RX ORDER — HYDROXYZINE 50 MG/1
100 TABLET, FILM COATED ORAL
Status: DISCONTINUED | OUTPATIENT
Start: 2022-12-31 | End: 2023-01-03 | Stop reason: HOSPADM

## 2022-12-31 RX ORDER — ACETAMINOPHEN 325 MG/1
650 TABLET ORAL EVERY 6 HOURS PRN
Status: DISCONTINUED | OUTPATIENT
Start: 2022-12-31 | End: 2023-01-03 | Stop reason: HOSPADM

## 2022-12-31 RX ORDER — HYDROXYZINE HYDROCHLORIDE 25 MG/1
25 TABLET, FILM COATED ORAL
Status: DISCONTINUED | OUTPATIENT
Start: 2022-12-31 | End: 2023-01-03 | Stop reason: HOSPADM

## 2022-12-31 RX ORDER — POLYETHYLENE GLYCOL 3350 17 G/17G
17 POWDER, FOR SOLUTION ORAL DAILY PRN
Status: DISCONTINUED | OUTPATIENT
Start: 2022-12-31 | End: 2022-12-31

## 2022-12-31 RX ORDER — AMOXICILLIN 250 MG
1 CAPSULE ORAL DAILY PRN
Status: DISCONTINUED | OUTPATIENT
Start: 2022-12-31 | End: 2023-01-03 | Stop reason: HOSPADM

## 2022-12-31 RX ORDER — OLANZAPINE 10 MG/1
10 INJECTION, POWDER, LYOPHILIZED, FOR SOLUTION INTRAMUSCULAR
Status: DISCONTINUED | OUTPATIENT
Start: 2022-12-31 | End: 2023-01-03 | Stop reason: HOSPADM

## 2022-12-31 RX ORDER — MIRTAZAPINE 7.5 MG/1
7.5 TABLET, FILM COATED ORAL
Status: DISCONTINUED | OUTPATIENT
Start: 2022-12-31 | End: 2023-01-03 | Stop reason: HOSPADM

## 2022-12-31 RX ORDER — HYDROXYZINE 50 MG/1
50 TABLET, FILM COATED ORAL
Status: DISCONTINUED | OUTPATIENT
Start: 2022-12-31 | End: 2023-01-03 | Stop reason: HOSPADM

## 2022-12-31 RX ORDER — ALBUTEROL SULFATE 90 UG/1
2 AEROSOL, METERED RESPIRATORY (INHALATION) EVERY 4 HOURS PRN
Status: DISCONTINUED | OUTPATIENT
Start: 2022-12-31 | End: 2023-01-03 | Stop reason: HOSPADM

## 2022-12-31 RX ORDER — IBUPROFEN 600 MG/1
600 TABLET ORAL EVERY 8 HOURS PRN
Status: DISCONTINUED | OUTPATIENT
Start: 2022-12-31 | End: 2023-01-03 | Stop reason: HOSPADM

## 2022-12-31 RX ORDER — PROPRANOLOL HYDROCHLORIDE 10 MG/1
10 TABLET ORAL EVERY 8 HOURS PRN
Status: DISCONTINUED | OUTPATIENT
Start: 2022-12-31 | End: 2023-01-03 | Stop reason: HOSPADM

## 2022-12-31 RX ORDER — MAGNESIUM HYDROXIDE/ALUMINUM HYDROXICE/SIMETHICONE 120; 1200; 1200 MG/30ML; MG/30ML; MG/30ML
30 SUSPENSION ORAL EVERY 4 HOURS PRN
Status: DISCONTINUED | OUTPATIENT
Start: 2022-12-31 | End: 2023-01-03 | Stop reason: HOSPADM

## 2022-12-31 RX ORDER — OLANZAPINE 10 MG/1
5 INJECTION, POWDER, LYOPHILIZED, FOR SOLUTION INTRAMUSCULAR
Status: DISCONTINUED | OUTPATIENT
Start: 2022-12-31 | End: 2023-01-03 | Stop reason: HOSPADM

## 2022-12-31 RX ORDER — LANOLIN ALCOHOL/MO/W.PET/CERES
3 CREAM (GRAM) TOPICAL
Status: DISCONTINUED | OUTPATIENT
Start: 2022-12-31 | End: 2022-12-31

## 2022-12-31 RX ORDER — OLANZAPINE 10 MG/1
10 TABLET ORAL
Status: DISCONTINUED | OUTPATIENT
Start: 2022-12-31 | End: 2023-01-03 | Stop reason: HOSPADM

## 2022-12-31 RX ORDER — BISACODYL 10 MG
10 SUPPOSITORY, RECTAL RECTAL DAILY PRN
Status: DISCONTINUED | OUTPATIENT
Start: 2022-12-31 | End: 2023-01-03 | Stop reason: HOSPADM

## 2022-12-31 NOTE — PLAN OF CARE
Problem: Alteration in Thoughts and Perception  Goal: Verbalize thoughts and feelings  Description: Interventions:  - Promote a nonjudgmental and trusting relationship with the patient through active listening and therapeutic communication  - Assess patient's level of functioning, behavior and potential for risk  - Engage patient in 1 on 1 interactions  - Encourage patient to express fears, feelings, frustrations, and discuss symptoms    - [place] patient to reality, help patient recognize reality-based thinking   - Administer medications as ordered and assess for potential side effects  - Provide the patient education related to the signs and symptoms of the illness and desired effects of prescribed medications  Outcome: Not Progressing  Goal: Refrain from acting on delusional thinking/internal stimuli  Description: Interventions:  - Monitor patient closely, per order   - Utilize least restrictive measures   - Set reasonable limits, give positive feedback for acceptable   - Administer medications as ordered and monitor of potential side effects  Outcome: Not Progressing  Goal: Agree to be compliant with medication regime, as prescribed and report medication side effects  Description: Interventions:  - Offer appropriate PRN medication and supervise ingestion; conduct AIMS, as needed   Outcome: Not Progressing  Goal: Recognize dysfunctional thoughts, communicate reality-based thoughts at the time of discharge  Description: Interventions:  - Provide medication and psycho-education to assist patient in compliance and developing insight into his/her illness   Outcome: Not Progressing     Problem: Ineffective Coping  Goal: Participates in unit activities  Description: Interventions:  - Provide therapeutic environment   - Provide required programming   - Redirect inappropriate behaviors   Outcome: Not Progressing     Problem: Depression  Goal: Refrain from isolation  Description: Interventions:  - Develop a trusting relationship   - Encourage socialization   Outcome: Progressing  Goal: Refrain from self-neglect  Outcome: Progressing     Problem: Anxiety  Goal: Anxiety is at manageable level  Description: Interventions:  - Assess and monitor patient's anxiety level  - Monitor for signs and symptoms (heart palpitations, chest pain, shortness of breath, headaches, nausea, feeling jumpy, restlessness, irritable, apprehensive)  - Collaborate with interdisciplinary team and initiate plan and interventions as ordered    - [place] patient to unit/surroundings  - Explain treatment plan  - Encourage participation in care  - Encourage verbalization of concerns/fears  - Identify coping mechanisms  - Assist in developing anxiety-reducing skills  - Administer/offer alternative therapies  - Limit or eliminate stimulants  Outcome: Progressing

## 2022-12-31 NOTE — ED NOTES
Patient is accepted at [facility]  Patient is accepted by Dr Mar Ramírez per [facility] in Intake  Transportation is arranged with [facility]  Transportation is scheduled for 2345  Patient may go to the floor at anytime  *Nurse report is to be called to [phone] prior to patient transfer  ALFONZO Busch  12/30/22    8493

## 2022-12-31 NOTE — ED NOTES
Insurance Authorization for admission:   Phone call placed to [employer]  Phone number: [phone]  Spoke to Charles Nasra  5 days approved  Level of care: Acute Inpt  (201)  Review on 01/04/2023  Authorization # to be obtained by accepting facility facility upon arrival         EVS (Eligibility Verification System) called - [phone]  Automated system indicates: Active with [employer] (ID# [id number])    Insurance Authorization for Transportation:    No required for [facility] transport      ALFONZO Goldsmith  12/30/22    0319

## 2022-12-31 NOTE — ASSESSMENT & PLAN NOTE
Patient is medically cleared for admission to the [facility] for treatment of the underlying psychiatric illness  VSS  No labs available for shlomo MARTINEZ will sign off   Please call with questions or concerns

## 2022-12-31 NOTE — CONSULTS
[address]  1995, 32 y o  male MRN: [id number]  Unit/Bed#: [facility] 346-02 Encounter: [id number]  Primary Care Provider: No primary care provider on file  Date and time admitted to hospital: 12/31/2022 12:31 AM    Inpatient consult for Medical Clearance for [facility] patient  Consult performed by: Sid Mills PA-C  Consult ordered by: Anny Mtz MD          Medical clearance for psychiatric admission  Assessment & Plan  Patient is medically cleared for admission to the [facility] for treatment of the underlying psychiatric illness  VSS  No labs available for hslomo MARTINEZ will sign off  Please call with questions or concerns    Asthma  Assessment & Plan  · No acute exacerbation  O2 sat 100  · Continue pre hospital albuterol PRN        Counseling / Coordination of Care Time: 30 minutes  Greater than 50% of total time spent on patient counseling and coordination of care  Collaboration of Care: Were Recommendations Directly Discussed with Primary Treatment Team? - No     History of Present Illness:    Estefany Gonsales is a 32 y o  male who is originally admitted to the psychiatry service due to paranoia  We are consulted for medical clearance for admission to [facility] Unit and treatment of underlying psychiatric illness  This patient has a past medical history significant for asthma  Per chart review, he initially presented to the ED via police for evaluation of paranoia  On evaluation, patient denies HA, dizziness, visual disturbances, CP, SOB, n/v/d/c/ or urinary symptoms  Review of Systems:    Review of Systems   Constitutional: Negative for chills, diaphoresis and fever  HENT: Negative for congestion, rhinorrhea, sinus pressure, sinus pain and sore throat  Eyes: Negative for pain and visual disturbance  Respiratory: Negative for cough, shortness of breath and wheezing  Cardiovascular: Negative for chest pain and palpitations  Gastrointestinal: Negative for abdominal distention, abdominal pain, constipation, diarrhea, nausea and vomiting  Genitourinary: Negative for difficulty urinating, dysuria, frequency, hematuria and urgency  Musculoskeletal: Negative for arthralgias, back pain and myalgias  Skin: Negative for rash  Neurological: Negative for dizziness, weakness, light-headedness and headaches  All other systems reviewed and are negative  Past Medical and Surgical History:     Past Medical History:   Diagnosis Date   • Asthma        No past surgical history on file  Meds/Allergies:    all medications and allergies reviewed    Allergies: No Known Allergies    Social History:     Marital Status: Single    Substance Use History:   Social History     Substance and Sexual Activity   Alcohol Use No     Social History     Tobacco Use   Smoking Status Never   Smokeless Tobacco Never     Social History     Substance and Sexual Activity   Drug Use Yes   • Types: Marijuana       Family History:    non-contributory    Physical Exam:     Vitals:   Blood Pressure: 120/70 (12/31/22 0049)  Pulse: 78 (12/31/22 0049)  Temperature: 97 9 °F (36 6 °C) (12/31/22 0049)  Temp Source: Temporal (12/31/22 0049)  Respirations: 16 (12/31/22 0049)  Height: 5' 8 5" (174 cm) (12/31/22 0049)  Weight - Scale: 63 kg (138 lb 12 8 oz) (12/31/22 0049)  SpO2: 100 % (12/31/22 0049)    Physical Exam  Constitutional:       General: He is not in acute distress  Appearance: Normal appearance  He is not ill-appearing, toxic-appearing or diaphoretic  HENT:      Head: Normocephalic and atraumatic  Nose: Nose normal  No congestion or rhinorrhea  Mouth/Throat:      Mouth: Mucous membranes are moist    Eyes:      General: No scleral icterus  Extraocular Movements: Extraocular movements intact  Cardiovascular:      Rate and Rhythm: Normal rate and regular rhythm  Heart sounds: Normal heart sounds  No murmur heard    Pulmonary:      Effort: Pulmonary effort is normal  No respiratory distress  Breath sounds: Normal breath sounds  No wheezing  Abdominal:      General: Abdomen is flat  Bowel sounds are normal  There is no distension  Palpations: Abdomen is soft  Tenderness: There is no abdominal tenderness  Musculoskeletal:      Right lower leg: No edema  Left lower leg: No edema  Skin:     General: Skin is warm and dry  Coloration: Skin is not jaundiced  Neurological:      General: No focal deficit present  Mental Status: He is alert and oriented to person, place, and time  Additional Data:     Lab Results: I have personally reviewed pertinent reports  No results found for: HGBA1C        EKG, Pathology, and Other Studies Reviewed on Admission:   · EKG: No EKG on file    ** Please Note: This note has been constructed using a voice recognition system   **

## 2022-12-31 NOTE — TREATMENT PLAN
TREATMENT PLAN REVIEW - [address]  [age] y o  1995 male MRN: [id number]    [address] [place] [address] Room / Bed: [place] [address]/[facility] 039-95 Encounter: [id number]          Admit Date/Time:  12/31/2022 12:31 AM    Treatment Team: Attending Provider: Yuridia Pina MD; Patient Care Assistant: Adrienne Lezama; Patient Care Assistant: Monica Vanegas; Registered Nurse: Caesar Flores RN; Licensed Practical Nurse: Josué Santamaria LPN; Patient Care Assistant: Henna Fuller; Patient Care Technician: Miranda Saint    Diagnosis: Principal Problem:    Psychosis [facility])  Active Problems:    Medical clearance for psychiatric admission    Asthma      Patient Strengths/Assets: capable of independent living, family ties, general fund of knowledge, good physical health, patient is on a voluntary commitment, resourceful, self reliant, supportive family/friends, work skills    Patient Barriers/Limitations: financial instability, lack of stable employment, limited motivation, poor insight, poor reasoning ability, self-care deficit, substance abuse    Short Term Goals: decrease in anxiety symptoms, decrease in paranoid thoughts, ability to stay safe on the unit, ability to stay free of restraints, improvement in ability to express basic needs, improvement in insight, improvement in reality testing, improvement in reasoning ability, sleep improvement, improvement in appetite, increase in socialization with peers on the unit, acceptance of need for psychiatric treatment, acceptance of psychiatric medications    Long Term Goals: improvement in anxiety, stabilization of mood, resolution of psychotic symptoms, improvement in reality testing, improvement in reasoning ability, improved insight, acceptance of need for psychiatric medications, acceptance of need for psychiatric treatment, acceptance of need for psychiatric follow up after discharge, acceptance of psychiatric diagnosis, adequate self care, adequate sleep, adequate appetite, appropriate interaction with peers, appropriate interaction with family, stable living arrangements upon discharge, establishment of family support upon discharge    Progress Towards Goals: participates more in milieu therapy, less agitated, discharge planning, still has psychotic symptoms, still has paranoid ideations    Recommended Treatment: medication management, patient medication education, group therapy, milieu therapy, continued Behavioral Health psychiatric evaluation/assessment process    Treatment Frequency: daily medication monitoring, group and milieu therapy daily, monitoring through interdisciplinary rounds, monitoring through weekly patient care conferences    Expected Discharge Date:  TBD    Discharge Plan: referral for outpatient medication management with a psychiatrist, referral for outpatient psychotherapy, referrals as indicated, return to previous living arrangement    Treatment Plan Created/Updated By: Carter Monroy DO

## 2022-12-31 NOTE — NURSING NOTE
Pt presented to breakfast, quiet, calm, pleasant  Pt is scant but appropriate and bright in conversation  Pt appears socially guarded and anxious at times, but denies needs and SS currently

## 2022-12-31 NOTE — NURSING NOTE
32Years old male admitted on 201 from [facility]  ED   who brought by police due to paranoia   Per ED note patient flagged the police down because he believes someone is following him   Also in ED note was mentioned  While police was talking to patient, a family pulled up next to police car asking patient if he was following their kid  Per ED report patent has been paranoid and lost 30 pounds in short period of time  On admission patient  reported that the camelia was walking towards his car and when he saw him   He start walking toward the camelia , then the camelia start running   Patient stats" people think I am crazy "   But the camelia he was the one coming towards me first"  On admission   PT  is AAOx4,  PT  Pleasant ,and cooperative with [address] and body assessment  Patient is denying any SI, HI, visual hallucinations, or pain at this time  Patient denies any past medical history,except for Asthma  Patient urin was positive for Amph/Meth, and THC  Pt denies any drug use, or alcohol use was educated on medication compliance, proper hydration, and when to come to staff if anxiety or depression becomes overwhelming     Oriented to unit and regulations; unit expectations reviewed  Q 7 minute checks initiated
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
## 2023-01-01 LAB
25(OH)D3 SERPL-MCNC: 14.8 NG/ML (ref 30–100)
ALBUMIN SERPL BCP-MCNC: 3.4 G/DL (ref 3.5–5)
ALP SERPL-CCNC: 53 U/L (ref 43–122)
ALT SERPL W P-5'-P-CCNC: 30 U/L
ANION GAP SERPL CALCULATED.3IONS-SCNC: 6 MMOL/L (ref 5–14)
AST SERPL W P-5'-P-CCNC: 38 U/L (ref 17–59)
ATRIAL RATE: 88 BPM
ATRIAL RATE: 92 BPM
BASOPHILS # BLD AUTO: 0.04 THOUSANDS/ÂΜL (ref 0–0.1)
BASOPHILS NFR BLD AUTO: 1 % (ref 0–1)
BILIRUB SERPL-MCNC: 0.35 MG/DL (ref 0.2–1)
BUN SERPL-MCNC: 13 MG/DL (ref 5–25)
CALCIUM ALBUM COR SERPL-MCNC: 9.6 MG/DL (ref 8.3–10.1)
CALCIUM SERPL-MCNC: 9.1 MG/DL (ref 8.4–10.2)
CHLORIDE SERPL-SCNC: 103 MMOL/L (ref 96–108)
CHOLEST SERPL-MCNC: 137 MG/DL
CO2 SERPL-SCNC: 28 MMOL/L (ref 21–32)
CREAT SERPL-MCNC: 0.87 MG/DL (ref 0.7–1.5)
EOSINOPHIL # BLD AUTO: 0.76 THOUSAND/ÂΜL (ref 0–0.61)
EOSINOPHIL NFR BLD AUTO: 10 % (ref 0–6)
ERYTHROCYTE [DISTWIDTH] IN BLOOD BY AUTOMATED COUNT: 14.3 % (ref 11.6–15.1)
EST. AVERAGE GLUCOSE BLD GHB EST-MCNC: 111 MG/DL
FOLATE SERPL-MCNC: 8 NG/ML (ref 3.1–17.5)
GFR SERPL CREATININE-BSD FRML MDRD: 116 ML/MIN/1.73SQ M
GLUCOSE P FAST SERPL-MCNC: 93 MG/DL (ref 70–99)
GLUCOSE SERPL-MCNC: 93 MG/DL (ref 70–99)
HAV IGM SER QL: NORMAL
HBA1C MFR BLD: 5.5 %
HBV CORE IGM SER QL: NORMAL
HBV SURFACE AG SER QL: NORMAL
HCT VFR BLD AUTO: 42.2 % (ref 36.5–49.3)
HCV AB SER QL: NORMAL
HDLC SERPL-MCNC: 46 MG/DL
HGB BLD-MCNC: 13.5 G/DL (ref 12–17)
IMM GRANULOCYTES # BLD AUTO: 0.01 THOUSAND/UL (ref 0–0.2)
IMM GRANULOCYTES NFR BLD AUTO: 0 % (ref 0–2)
LDLC SERPL CALC-MCNC: 81 MG/DL
LYMPHOCYTES # BLD AUTO: 3.11 THOUSANDS/ÂΜL (ref 0.6–4.47)
LYMPHOCYTES NFR BLD AUTO: 42 % (ref 14–44)
MCH RBC QN AUTO: 29.4 PG (ref 26.8–34.3)
MCHC RBC AUTO-ENTMCNC: 32 G/DL (ref 31.4–37.4)
MCV RBC AUTO: 92 FL (ref 82–98)
MONOCYTES # BLD AUTO: 0.48 THOUSAND/ÂΜL (ref 0.17–1.22)
MONOCYTES NFR BLD AUTO: 7 % (ref 4–12)
NEUTROPHILS # BLD AUTO: 2.93 THOUSANDS/ÂΜL (ref 1.85–7.62)
NEUTS SEG NFR BLD AUTO: 40 % (ref 43–75)
NONHDLC SERPL-MCNC: 91 MG/DL
NRBC BLD AUTO-RTO: 0 /100 WBCS
P AXIS: 59 DEGREES
P AXIS: 60 DEGREES
PLATELET # BLD AUTO: 287 THOUSANDS/UL (ref 149–390)
PMV BLD AUTO: 10.6 FL (ref 8.9–12.7)
POTASSIUM SERPL-SCNC: 4.1 MMOL/L (ref 3.5–5.3)
PR INTERVAL: 122 MS
PR INTERVAL: 126 MS
PROT SERPL-MCNC: 6.2 G/DL (ref 6.4–8.4)
QRS AXIS: 80 DEGREES
QRS AXIS: 83 DEGREES
QRSD INTERVAL: 80 MS
QRSD INTERVAL: 82 MS
QT INTERVAL: 342 MS
QT INTERVAL: 346 MS
QTC INTERVAL: 418 MS
QTC INTERVAL: 422 MS
RBC # BLD AUTO: 4.59 MILLION/UL (ref 3.88–5.62)
SODIUM SERPL-SCNC: 137 MMOL/L (ref 135–147)
T WAVE AXIS: 53 DEGREES
T WAVE AXIS: 58 DEGREES
TRIGL SERPL-MCNC: 51 MG/DL
TSH SERPL DL<=0.05 MIU/L-ACNC: 0.96 UIU/ML (ref 0.45–4.5)
VENTRICULAR RATE: 88 BPM
VENTRICULAR RATE: 92 BPM
VIT B12 SERPL-MCNC: 634 PG/ML (ref 100–900)
WBC # BLD AUTO: 7.33 THOUSAND/UL (ref 4.31–10.16)

## 2023-01-01 RX ADMIN — MIRTAZAPINE 7.5 MG: 7.5 TABLET, FILM COATED ORAL at 21:59

## 2023-01-01 NOTE — NURSING NOTE
Denies SI/HI/SIB/AVH  Scant in conversation but polite  Isolative this AM  Meal compliant  Calm and in control  Offers no complaints

## 2023-01-01 NOTE — NURSING NOTE
Pt has remained visible, calm, cooperative and quiet throughout shift  Pt denies needs and is pleasant

## 2023-01-01 NOTE — PROGRESS NOTES
Progress Note - Behavioral Health   Todd Sosa 32 y o  male MRN: [id number]  Unit/Bed#: [facility] 346-02 Encounter: [id number]    Assessment/Plan   Principal Problem:    Psychosis [facility])  Active Problems:    Medical clearance for psychiatric admission    Asthma      Recommended Treatment:   • Discussed with patient starting psychotropic medications to help with paranoid thinking and to improve his thoughts  Patient continues to refuse medication at this time  There is no indication for medications over objection as patient has been cooperative with unit care and not demonstrating dangerous behaviors to self or others  • Continue with group therapy, milieu therapy and occupational therapy  • Continue frequent safety checks and vitals per unit protocol  • Continue medication management per SLIM as indicated  o CBC, TSH, HgbA1c, WNL  o CMP with decreased albumin and total protein; adjust diet to double portions for increased caloric intake as patient's appetite is returning  o Folate, Vitamin B12, Vitamin D, RPR, Hepatitis panel pending  • Case discussed with treatment team   • Discharge disposition: to be determined pending continued inpatient therapies  • Legal status: 201    Risks, benefits and possible side effects of Medications: Risks, benefits, and possible side effects of as needed medications have previously been explained  No new medications at this time  ------------------------------------------------------------    Subjective: Patient seen and evaluated at bedside, patient in no acute distress  Per nursing report, Jef Dias has been isolative to self at times,Visible on the unit at times, isolative to self during beginning of shift yesterday  On evening shift, patient was observed resting in bed  He has been pleasant and cooperative with staff  He has denied auditory or visual hallucinations, thoughts to harm himself, and thoughts to hurt others   Patient has been meal compliant and cooperative with unit protocols  Today, Michelle Kumar reports his mood as "happy "  He states the rest has helped him to feel "10 times better "  He endorses he slept 7 to 10 hours without interruption, he denies nightmares or vivid dreams  The patient endorses an improving appetite, stated he was hungry during evaluation and this writer explained he could ask for a snack before lunchtime  Patient expressed understanding and was observed appropriately approaching staff for snack after evaluation  Patient denied auditory or visual hallucinations  When asked about paranoid ideations including feeling followed or involved in title washing scheme, patient states "I do not think so "  When asked if patient could identify a trigger for those thoughts occurring he states, "people talking about it "  Discussed with patient methamphetamine use, patient nodded and was in agreement methamphetamine may have played a role in his thought patterns and paranoia, though continues to be guarded regarding substance use  Patient denied suicidal or homicidal ideations at time of evaluation and contracts for safety on the unit  Progress Toward Goals: slow improvement    Psychiatric Review of Systems:  Behavior over the last 24 hours: improving  Sleep: improving  Appetite: improving, decreased from baseline  Medication side effects: none verbalized  ROS: Complete review of systems is negative except as noted above      Vital signs in last 24 hours:  Temp:  [98 2 °F (36 8 °C)-98 6 °F (37 °C)] 98 2 °F (36 8 °C)  HR:  [] 65  Resp:  [16] 16  BP: (125-133)/(65-68) 125/68    Mental Status Exam:  Appearance:  alert, good eye contact, appears stated age, casually dressed, appropriate grooming and hygiene, thin and bearded   Behavior:  calm, cooperative and laying in bed   Motor: no abnormal movements and normal gait and balance   Speech:  spontaneous, clear, soft, not hyperverbal, scant and coherent   Mood:  "happy"   Affect:  constricted, brighter than previous and intermittent smile   Thought Process:  organized, normal rate of thoughts, concrete associations   Thought Content: no verbalized delusions or overt paranoia, intermittent negative thinking   Perceptual disturbances: no reported hallucinations and does not appear to be responding to internal stimuli at this time   Risk Potential: No active or passive suicidal or homicidal ideation was verbalized during interview, Low potential for aggression based on previous behavior   Cognition: oriented to self and situation, memory grossly intact, appears to be of average intelligence, impaired abstract reasoning, age-appropriate attention span and concentration and cognition not formally tested   Insight:  Limited   Judgment: Limited     Current Medications:  Current Facility-Administered Medications   Medication Dose Route Frequency Provider Last Rate   • acetaminophen  650 mg Oral Q6H PRN Corrinne Pap, MD     • albuterol  2 puff Inhalation Q4H PRN Fiona Dunbar PA-C     • aluminum-magnesium hydroxide-simethicone  30 mL Oral Q4H PRN Corrinne Pap, MD     • benztropine  1 mg Intramuscular Q4H PRN Max 6/day Corrinne Pap, MD     • benztropine  1 mg Oral Q4H PRN Max 6/day Corrinne Pap, MD     • bisacodyl  10 mg Rectal Daily PRN Corrinne Pap, MD     • hydrOXYzine HCL  50 mg Oral Q6H PRN Max 4/day Corrinne Pap, MD      Or   • diphenhydrAMINE  50 mg Intramuscular Q6H PRN Corrinne Pap, MD     • glycerin-hypromellose-  1 drop Both Eyes Q3H PRN Corrinne Pap, MD     • hydrOXYzine HCL  100 mg Oral Q6H PRN Max 4/day Corrinne Pap, MD      Or   • LORazepam  2 mg Intramuscular Q6H PRN Corrinne Pap, MD     • hydrOXYzine HCL  25 mg Oral Q6H PRN Max 4/day Corrinne Pap, MD     • ibuprofen  400 mg Oral Q6H PRN Corrinne Pap, MD     • ibuprofen  600 mg Oral Q8H PRN Corrinne Pap, MD     • melatonin  3 mg Oral HS PRN Jennifer Joseph DO     • mirtazapine  7 5 mg Oral HS PRN Corrinne Pap, MD     • OLANZapine  10 mg Oral Q3H PRN Max 3/day Abby Rivas MD      Or   • OLANZapine  10 mg Intramuscular Q3H PRN Max 3/day Abby Rivas MD     • OLANZapine  5 mg Oral Q3H PRN Max 6/day Abby Rivas MD      Or   • OLANZapine  5 mg Intramuscular Q3H PRN Max 6/day Abby Rivas MD     • OLANZapine  2 5 mg Oral Q3H PRN Max 8/day Abby Rivas MD     • polyethylene glycol  17 g Oral Daily PRN Abby Rivas MD     • propranolol  10 mg Oral Q8H PRN Abby Rivas MD     • senna-docusate sodium  1 tablet Oral Daily PRN Abby Rivas MD         Behavioral Health Medications: all current active meds have been reviewed  Changes as in plan section above  Laboratory results:  I have personally reviewed all pertinent laboratory/tests results    Recent Results (from the past 48 hour(s))   POCT alcohol breath test    Collection Time: 12/30/22  4:35 PM   Result Value Ref Range    EXTBreath Alcohol 0 000    FLU/RSV/COVID - if FLU/RSV clinically relevant    Collection Time: 12/30/22  5:22 PM    Specimen: Nose; Nares   Result Value Ref Range    SARS-CoV-2 Negative Negative    INFLUENZA A PCR Negative Negative    INFLUENZA B PCR Negative Negative    RSV PCR Negative Negative   Rapid drug screen, urine    Collection Time: 12/30/22  6:52 PM   Result Value Ref Range    Amph/Meth UR Positive (A) Negative    Barbiturate Ur Negative Negative    Benzodiazepine Urine Negative Negative    Cocaine Urine Negative Negative    Methadone Urine Negative Negative    Opiate Urine Negative Negative    PCP Ur Negative Negative    THC Urine Positive (A) Negative    Oxycodone Urine Negative Negative   CBC and differential    Collection Time: 01/01/23  6:23 AM   Result Value Ref Range    WBC 7 33 4 31 - 10 16 Thousand/uL    RBC 4 59 3 88 - 5 62 Million/uL    Hemoglobin 13 5 12 0 - 17 0 g/dL    Hematocrit 42 2 36 5 - 49 3 %    MCV 92 82 - 98 fL    MCH 29 4 26 8 - 34 3 pg    MCHC 32 0 31 4 - 37 4 g/dL    RDW 14 3 11 6 - 15 1 %    MPV 10 6 8 9 - 12 7 fL    Platelets 700 148 - 181 Thousands/uL    nRBC 0 /100 WBCs    Neutrophils Relative 40 (L) 43 - 75 %    Immat GRANS % 0 0 - 2 %    Lymphocytes Relative 42 14 - 44 %    Monocytes Relative 7 4 - 12 %    Eosinophils Relative 10 (H) 0 - 6 %    Basophils Relative 1 0 - 1 %    Neutrophils Absolute 2 93 1 85 - 7 62 Thousands/µL    Immature Grans Absolute 0 01 0 00 - 0 20 Thousand/uL    Lymphocytes Absolute 3 11 0 60 - 4 47 Thousands/µL    Monocytes Absolute 0 48 0 17 - 1 22 Thousand/µL    Eosinophils Absolute 0 76 (H) 0 00 - 0 61 Thousand/µL    Basophils Absolute 0 04 0 00 - 0 10 Thousands/µL   Comprehensive metabolic panel    Collection Time: 01/01/23  6:23 AM   Result Value Ref Range    Sodium 137 135 - 147 mmol/L    Potassium 4 1 3 5 - 5 3 mmol/L    Chloride 103 96 - 108 mmol/L    CO2 28 21 - 32 mmol/L    ANION GAP 6 5 - 14 mmol/L    BUN 13 5 - 25 mg/dL    Creatinine 0 87 0 70 - 1 50 mg/dL    Glucose 93 70 - 99 mg/dL    Glucose, Fasting 93 70 - 99 mg/dL    Calcium 9 1 8 4 - 10 2 mg/dL    Corrected Calcium 9 6 8 3 - 10 1 mg/dL    AST 38 17 - 59 U/L    ALT 30 <50 U/L    Alkaline Phosphatase 53 43 - 122 U/L    Total Protein 6 2 (L) 6 4 - 8 4 g/dL    Albumin 3 4 (L) 3 5 - 5 0 g/dL    Total Bilirubin 0 35 0 20 - 1 00 mg/dL    eGFR 116 ml/min/1 73sq m   TSH, 3rd generation with Free T4 reflex    Collection Time: 01/01/23  6:23 AM   Result Value Ref Range    TSH 3RD GENERATON 0 959 0 450 - 4 500 uIU/mL   Lipid panel    Collection Time: 01/01/23  6:23 AM   Result Value Ref Range    Cholesterol 137 See Comment mg/dL    Triglycerides 51 See Comment mg/dL    HDL, Direct 46 >=40 mg/dL    LDL Calculated 81 <130 mg/dL    Non-HDL-Chol (CHOL-HDL) 91 mg/dl   Hemoglobin A1C    Collection Time: 01/01/23  6:23 AM   Result Value Ref Range    Hemoglobin A1C 5 5 Normal 3 8-5 6%; PreDiabetic 5 7-6 4%;  Diabetic >=6 5%; Glycemic control for adults with diabetes <7 0% %     mg/dl        Nida December, DO   Psychiatry Resident, PGY-II

## 2023-01-01 NOTE — NURSING NOTE
Patient resting in bed  Pleasant and cooperative  He was visible on the unit for snack  He denies AVH/SI/HI at this time  He has been in behavioral control  He is flat in affect  Patient denies further needs at this time

## 2023-01-01 NOTE — PLAN OF CARE
Problem: Alteration in Thoughts and Perception  Goal: Verbalize thoughts and feelings  Description: Interventions:  - Promote a nonjudgmental and trusting relationship with the patient through active listening and therapeutic communication  - Assess patient's level of functioning, behavior and potential for risk  - Engage patient in 1 on 1 interactions  - Encourage patient to express fears, feelings, frustrations, and discuss symptoms    - [place] patient to reality, help patient recognize reality-based thinking   - Administer medications as ordered and assess for potential side effects  - Provide the patient education related to the signs and symptoms of the illness and desired effects of prescribed medications  Outcome: Progressing  Goal: Refrain from acting on delusional thinking/internal stimuli  Description: Interventions:  - Monitor patient closely, per order   - Utilize least restrictive measures   - Set reasonable limits, give positive feedback for acceptable   - Administer medications as ordered and monitor of potential side effects  Outcome: Progressing  Goal: Agree to be compliant with medication regime, as prescribed and report medication side effects  Description: Interventions:  - Offer appropriate PRN medication and supervise ingestion; conduct AIMS, as needed   Outcome: Progressing  Goal: Recognize dysfunctional thoughts, communicate reality-based thoughts at the time of discharge  Description: Interventions:  - Provide medication and psycho-education to assist patient in compliance and developing insight into his/her illness   Outcome: Progressing     Problem: Ineffective Coping  Goal: Participates in unit activities  Description: Interventions:  - Provide therapeutic environment   - Provide required programming   - Redirect inappropriate behaviors   Outcome: Progressing     Problem: Depression  Goal: Refrain from self-neglect  Outcome: Progressing     Problem: Anxiety  Goal: Anxiety is at manageable level  Description: Interventions:  - Assess and monitor patient's anxiety level  - Monitor for signs and symptoms (heart palpitations, chest pain, shortness of breath, headaches, nausea, feeling jumpy, restlessness, irritable, apprehensive)  - Collaborate with interdisciplinary team and initiate plan and interventions as ordered    - [place] patient to unit/surroundings  - Explain treatment plan  - Encourage participation in care  - Encourage verbalization of concerns/fears  - Identify coping mechanisms  - Assist in developing anxiety-reducing skills  - Administer/offer alternative therapies  - Limit or eliminate stimulants  Outcome: Progressing     Problem: Depression  Goal: Refrain from isolation  Description: Interventions:  - Develop a trusting relationship   - Encourage socialization   Outcome: Not Progressing

## 2023-01-01 NOTE — ED NOTES
Ochoa Martínez from [facility] called and gave auth from 12/30-1/3/23, review on 1/3 with Guy Hutton #[id number]

## 2023-01-02 RX ADMIN — Medication 3 MG: at 22:09

## 2023-01-02 NOTE — NURSING NOTE
Patient was observed walking the unit at times, but has been minding his own business due to the acuity of the unit  He states he did not sleep well last night  Patient was offered 7 5mg remeron for sleep  Patient accepted and stated, "I really appreciate that  I would like to get a good rest so I can be up and awake to speak with the doctors  I am hoping they can clear me to leave tomorrow as I miss my family "     Patient is calm, cooperative, pleasant when engaged in conversation, he has not been seen or heard IR, he appears brighter today when he ambulates the case  He has not had any behavioral issues to note since his admit date of 12/31/2022 during evening and overnight shifts  The patient has not displayed any paranoid behaviors this shift  He denies AVH/SI/HI  11:15 medication effective 7 5mg remeron for sleep  Patient is resting in bed

## 2023-01-02 NOTE — PROGRESS NOTES
Progress Note - Behavioral Health   Ana Salas 32 y o  male MRN: [id number]  Unit/Bed#: [facility] 346-02 Encounter: [id number]    Assessment/Plan   Principal Problem:    Psychosis [facility])  Active Problems:    Medical clearance for psychiatric admission    Asthma      Recommended Treatment:   • Discussed with patient starting psychotropic medications to help with paranoid thinking and to improve clarity of his thoughts  Patient continues to refuse medication  o There is no current indication for medications over objection  • Continue with group therapy, milieu therapy and occupational therapy  • Continue frequent safety checks and vitals per unit protocol  • Continue medication management per SLIM as indicated   o Hepatitis panel negative  o Vitamin B12, Folate WNL  o Vitamin D decreased to 14 8 ng/mL  o RPR pending  o EKG on 01/01/23 demonstrated NSR with sinus arrhythmia, HR 88 bpm, and QT/QTc 346/418 ms  • Case discussed with treatment team   • Discharge disposition: to be determined pending continued inpatient therapies  • Legal status: 201    Risks, benefits and possible side effects of Medications: Risks, benefits, and possible side effects of as needed medications have previously been explained  No new medications at this time  ------------------------------------------------------------    Subjective: Patient seen and evaluated at bedside with attending physician, patient in no acute distress  Per nursing report, Mary Luz has been calm, visible, and cooperative, denying psychiatric symptoms to nursing staff  Patient remains scant in conversation  Patient observed walking the unit at times during night shift, keeping to himself  Patient accepted PRN Remeron 7 5 mg for sleep at 2159  Today, Mary Luz reports his mood as "I'm good "  He endorsed he "slept good," and endorses a good appetite    Discussed with patient as needed Remeron which was taken yesterday, patient states "I was offered the medication so I took it, I could have slept without it "  Discussed with patient it was good he had rest and good sleep with the medication  Patient discussed stressors of losing his apartment and having to clear his items out within the next week, otherwise his items will be thrown out  Patient discusses this is the reason he needs to be discharged  Discussed with patient continued inpatient treatment today and disposition planning with case management and full treatment team tomorrow  Patient stated he would like to be discharged today or tomorrow and asked about 72-hour notice, discussed with patient voluntary admission status and 72-hour notice process for which he expressed understanding  Patient denied auditory or visual hallucinations at time of evaluation, patient denied suicidal homicidal ideations at time of evaluation  The patient reported his paranoid ideations "got better," and denied having thoughts of being followed, targeted, or monitored today  The patient stated "I am all normal "    Progress Toward Goals: mild improvement    Psychiatric Review of Systems:  Behavior over the last 24 hours: unchanged  Sleep: improving  Appetite: adequate, normal compared to baseline  Medication side effects: none verbalized  ROS: Complete review of systems is negative except as noted above      Vital signs in last 24 hours:  Temp:  [97 6 °F (36 4 °C)] 97 6 °F (36 4 °C)  HR:  [95] 95  Resp:  [16] 16  BP: (112)/(59) 112/59    Mental Status Exam:  Appearance:  alert, good eye contact, appears stated age, casually dressed, appropriate grooming and hygiene, thin and bearded   Behavior:  calm, cooperative and laying in bed   Motor: no abnormal movements and normal gait and balance observed when patient got up and out of bed after evaluation   Speech:  spontaneous, clear, soft, not hyperverbal and coherent   Mood:  "I'm good"   Affect:  constricted and irritable edge   Thought Process:  organized, normal rate of thoughts, concrete associations   Thought Content: no verbalized delusions or overt paranoia, intermittent negative thinking   Perceptual disturbances: no reported hallucinations and does not appear to be responding to internal stimuli at this time   Risk Potential: No active or passive suicidal or homicidal ideation was verbalized during interview, Low potential for aggression based on previous behavior   Cognition: oriented to self and situation, memory grossly intact, appears to be of average intelligence, impaired abstract reasoning, age-appropriate attention span and concentration and cognition not formally tested   Insight:  Limited   Judgment: Limited     Current Medications:  Current Facility-Administered Medications   Medication Dose Route Frequency Provider Last Rate   • acetaminophen  650 mg Oral Q6H PRN Ulises Carrizales MD     • albuterol  2 puff Inhalation Q4H PRN Ronal Castillo PA-C     • aluminum-magnesium hydroxide-simethicone  30 mL Oral Q4H PRN Ulises Carrizales MD     • benztropine  1 mg Intramuscular Q4H PRN Max 6/day Ulises Carrizales MD     • benztropine  1 mg Oral Q4H PRN Max 6/day Ulises Carrizales MD     • bisacodyl  10 mg Rectal Daily PRN Ulises Carrizales MD     • hydrOXYzine HCL  50 mg Oral Q6H PRN Max 4/day Ulises Carrizales MD      Or   • diphenhydrAMINE  50 mg Intramuscular Q6H PRN Ulises Carrizales MD     • glycerin-hypromellose-  1 drop Both Eyes Q3H PRN Ulises Carrizales MD     • hydrOXYzine HCL  100 mg Oral Q6H PRN Max 4/day Ulises Carrizales MD      Or   • LORazepam  2 mg Intramuscular Q6H PRN Ulises Carrizales MD     • hydrOXYzine HCL  25 mg Oral Q6H PRN Max 4/day Ulises Carrizales MD     • ibuprofen  400 mg Oral Q6H PRN Ulises Carrizales MD     • ibuprofen  600 mg Oral Q8H PRN Ulises Carrizales MD     • melatonin  3 mg Oral HS PRN Jennifer Joseph DO     • mirtazapine  7 5 mg Oral HS PRN Ulises Carrizales MD     • OLANZapine  10 mg Oral Q3H PRN Max 3/day Ulises Carrizales MD      Or   • OLANZapine  10 mg Intramuscular Q3H PRN Max 3/day Marissa Galeano MD     • OLANZapine  5 mg Oral Q3H PRN Max 6/day Marissa Galeano MD      Or   • OLANZapine  5 mg Intramuscular Q3H PRN Max 6/day Marissa Galeano MD     • OLANZapine  2 5 mg Oral Q3H PRN Max 8/day Marissa Galeano MD     • polyethylene glycol  17 g Oral Daily PRN Marissa Galeano MD     • propranolol  10 mg Oral Q8H PRN Marissa Galeano MD     • senna-docusate sodium  1 tablet Oral Daily PRN Marissa Galeano MD         Behavioral Health Medications: all current active meds have been reviewed  Changes as in plan section above  Laboratory results:  I have personally reviewed all pertinent laboratory/tests results    Recent Results (from the past 48 hour(s))   CBC and differential    Collection Time: 01/01/23  6:23 AM   Result Value Ref Range    WBC 7 33 4 31 - 10 16 Thousand/uL    RBC 4 59 3 88 - 5 62 Million/uL    Hemoglobin 13 5 12 0 - 17 0 g/dL    Hematocrit 42 2 36 5 - 49 3 %    MCV 92 82 - 98 fL    MCH 29 4 26 8 - 34 3 pg    MCHC 32 0 31 4 - 37 4 g/dL    RDW 14 3 11 6 - 15 1 %    MPV 10 6 8 9 - 12 7 fL    Platelets 813 798 - 200 Thousands/uL    nRBC 0 /100 WBCs    Neutrophils Relative 40 (L) 43 - 75 %    Immat GRANS % 0 0 - 2 %    Lymphocytes Relative 42 14 - 44 %    Monocytes Relative 7 4 - 12 %    Eosinophils Relative 10 (H) 0 - 6 %    Basophils Relative 1 0 - 1 %    Neutrophils Absolute 2 93 1 85 - 7 62 Thousands/µL    Immature Grans Absolute 0 01 0 00 - 0 20 Thousand/uL    Lymphocytes Absolute 3 11 0 60 - 4 47 Thousands/µL    Monocytes Absolute 0 48 0 17 - 1 22 Thousand/µL    Eosinophils Absolute 0 76 (H) 0 00 - 0 61 Thousand/µL    Basophils Absolute 0 04 0 00 - 0 10 Thousands/µL   Comprehensive metabolic panel    Collection Time: 01/01/23  6:23 AM   Result Value Ref Range    Sodium 137 135 - 147 mmol/L    Potassium 4 1 3 5 - 5 3 mmol/L    Chloride 103 96 - 108 mmol/L    CO2 28 21 - 32 mmol/L    ANION GAP 6 5 - 14 mmol/L    BUN 13 5 - 25 mg/dL    Creatinine 0 87 0 70 - 1 50 mg/dL    Glucose 93 70 - 99 mg/dL    Glucose, Fasting 93 70 - 99 mg/dL    Calcium 9 1 8 4 - 10 2 mg/dL    Corrected Calcium 9 6 8 3 - 10 1 mg/dL    AST 38 17 - 59 U/L    ALT 30 <50 U/L    Alkaline Phosphatase 53 43 - 122 U/L    Total Protein 6 2 (L) 6 4 - 8 4 g/dL    Albumin 3 4 (L) 3 5 - 5 0 g/dL    Total Bilirubin 0 35 0 20 - 1 00 mg/dL    eGFR 116 ml/min/1 73sq m   Hepatitis panel, acute    Collection Time: 01/01/23  6:23 AM   Result Value Ref Range    Hepatitis B Surface Ag Non-reactive Non-reactive, NonReactive - Confirmed    Hep A IgM Non-reactive Non-reactive, Equivocal-Suggest Recollect    Hepatitis C Ab Non-reactive Non-reactive    Hep B C IgM Non-reactive Non-reactive   Vitamin D 25 hydroxy    Collection Time: 01/01/23  6:23 AM   Result Value Ref Range    Vit D, 25-Hydroxy 14 8 (L) 30 0 - 100 0 ng/mL   Vitamin B12    Collection Time: 01/01/23  6:23 AM   Result Value Ref Range    Vitamin B-12 634 100 - 900 pg/mL   Folate    Collection Time: 01/01/23  6:23 AM   Result Value Ref Range    Folate 8 0 3 1 - 17 5 ng/mL   TSH, 3rd generation with Free T4 reflex    Collection Time: 01/01/23  6:23 AM   Result Value Ref Range    TSH 3RD GENERATON 0 959 0 450 - 4 500 uIU/mL   Lipid panel    Collection Time: 01/01/23  6:23 AM   Result Value Ref Range    Cholesterol 137 See Comment mg/dL    Triglycerides 51 See Comment mg/dL    HDL, Direct 46 >=40 mg/dL    LDL Calculated 81 <130 mg/dL    Non-HDL-Chol (CHOL-HDL) 91 mg/dl   Hemoglobin A1C    Collection Time: 01/01/23  6:23 AM   Result Value Ref Range    Hemoglobin A1C 5 5 Normal 3 8-5 6%; PreDiabetic 5 7-6 4%;  Diabetic >=6 5%; Glycemic control for adults with diabetes <7 0% %     mg/dl   ECG 12 lead    Collection Time: 01/01/23 11:39 AM   Result Value Ref Range    Ventricular Rate 92 BPM    Atrial Rate 92 BPM    HI Interval 126 ms    QRSD Interval 80 ms    QT Interval 342 ms    QTC Interval 422 ms    P Tat[place] 59 degrees    QRS Axis 83 degrees    T Wave Tat[place] 53 degrees   ECG 12 lead Collection Time: 01/01/23 11:40 AM   Result Value Ref Range    Ventricular Rate 88 BPM    Atrial Rate 88 BPM    HI Interval 122 ms    QRSD Interval 82 ms    QT Interval 346 ms    QTC Interval 418 ms    P Axis 60 degrees    QRS Axis 80 degrees    T Wave Axis 58 degrees        Garret Ledbetter DO   Psychiatry Resident, PGY-II

## 2023-01-02 NOTE — PLAN OF CARE
Problem: Alteration in Thoughts and Perception  Goal: Verbalize thoughts and feelings  Description: Interventions:  - Promote a nonjudgmental and trusting relationship with the patient through active listening and therapeutic communication  - Assess patient's level of functioning, behavior and potential for risk  - Engage patient in 1 on 1 interactions  - Encourage patient to express fears, feelings, frustrations, and discuss symptoms    - [place] patient to reality, help patient recognize reality-based thinking   - Administer medications as ordered and assess for potential side effects  - Provide the patient education related to the signs and symptoms of the illness and desired effects of prescribed medications  Outcome: Progressing  Goal: Refrain from acting on delusional thinking/internal stimuli  Description: Interventions:  - Monitor patient closely, per order   - Utilize least restrictive measures   - Set reasonable limits, give positive feedback for acceptable   - Administer medications as ordered and monitor of potential side effects  Outcome: Progressing  Goal: Agree to be compliant with medication regime, as prescribed and report medication side effects  Description: Interventions:  - Offer appropriate PRN medication and supervise ingestion; conduct AIMS, as needed   Outcome: Progressing  Goal: Recognize dysfunctional thoughts, communicate reality-based thoughts at the time of discharge  Description: Interventions:  - Provide medication and psycho-education to assist patient in compliance and developing insight into his/her illness   Outcome: Progressing     Problem: Depression  Goal: Refrain from isolation  Description: Interventions:  - Develop a trusting relationship   - Encourage socialization   Outcome: Progressing  Goal: Refrain from self-neglect  Outcome: Progressing     Problem: Anxiety  Goal: Anxiety is at manageable level  Description: Interventions:  - Assess and monitor patient's anxiety level    - Monitor for signs and symptoms (heart palpitations, chest pain, shortness of breath, headaches, nausea, feeling jumpy, restlessness, irritable, apprehensive)  - Collaborate with interdisciplinary team and initiate plan and interventions as ordered    - [place] patient to unit/surroundings  - Explain treatment plan  - Encourage participation in care  - Encourage verbalization of concerns/fears  - Identify coping mechanisms  - Assist in developing anxiety-reducing skills  - Administer/offer alternative therapies  - Limit or eliminate stimulants  Outcome: Progressing

## 2023-01-02 NOTE — NURSING NOTE
Pt denies SI/HI/AH/VH  Present in milieu  Social with select peers but mainly isolative to self  Meal compliant  Pt spent majority of morning on telephone  Pt signed 72 hr notice at 1104  Pt overheard on phone stating " Im wasting my time here,  They are not even giving me any medication so what's the point in being here"  Scant/guarded with communication but pleasant upon approach  Pt remained calm this afternoon  Will continue to monitor

## 2023-01-02 NOTE — NURSING NOTE
Pt denies SI/HI/AH/VH  Present in dayroom and milieu  Isolative to self  Scant/guarded with communication  Meal compliant  Will continue to monitor

## 2023-01-03 VITALS
RESPIRATION RATE: 16 BRPM | WEIGHT: 138.8 LBS | HEIGHT: 69 IN | DIASTOLIC BLOOD PRESSURE: 73 MMHG | SYSTOLIC BLOOD PRESSURE: 109 MMHG | HEART RATE: 89 BPM | OXYGEN SATURATION: 100 % | TEMPERATURE: 98 F | BODY MASS INDEX: 20.56 KG/M2

## 2023-01-03 PROBLEM — Z00.8 MEDICAL CLEARANCE FOR PSYCHIATRIC ADMISSION: Status: RESOLVED | Noted: 2022-12-31 | Resolved: 2023-01-03

## 2023-01-03 LAB — RPR SER QL: NORMAL

## 2023-01-03 NOTE — PLAN OF CARE
Problem: Alteration in Thoughts and Perception  Goal: Verbalize thoughts and feelings  Description: Interventions:  - Promote a nonjudgmental and trusting relationship with the patient through active listening and therapeutic communication  - Assess patient's level of functioning, behavior and potential for risk  - Engage patient in 1 on 1 interactions  - Encourage patient to express fears, feelings, frustrations, and discuss symptoms    - [place] patient to reality, help patient recognize reality-based thinking   - Administer medications as ordered and assess for potential side effects  - Provide the patient education related to the signs and symptoms of the illness and desired effects of prescribed medications  Outcome: Progressing  Goal: Refrain from acting on delusional thinking/internal stimuli  Description: Interventions:  - Monitor patient closely, per order   - Utilize least restrictive measures   - Set reasonable limits, give positive feedback for acceptable   - Administer medications as ordered and monitor of potential side effects  Outcome: Progressing  Goal: Agree to be compliant with medication regime, as prescribed and report medication side effects  Description: Interventions:  - Offer appropriate PRN medication and supervise ingestion; conduct AIMS, as needed   Outcome: Progressing  Goal: Recognize dysfunctional thoughts, communicate reality-based thoughts at the time of discharge  Description: Interventions:  - Provide medication and psycho-education to assist patient in compliance and developing insight into his/her illness   Outcome: Progressing     Problem: Depression  Goal: Refrain from isolation  Description: Interventions:  - Develop a trusting relationship   - Encourage socialization   Outcome: Progressing  Goal: Refrain from self-neglect  Outcome: Progressing     Problem: Anxiety  Goal: Anxiety is at manageable level  Description: Interventions:  - Assess and monitor patient's anxiety level    - Monitor for signs and symptoms (heart palpitations, chest pain, shortness of breath, headaches, nausea, feeling jumpy, restlessness, irritable, apprehensive)  - Collaborate with interdisciplinary team and initiate plan and interventions as ordered    - [place] patient to unit/surroundings  - Explain treatment plan  - Encourage participation in care  - Encourage verbalization of concerns/fears  - Identify coping mechanisms  - Assist in developing anxiety-reducing skills  - Administer/offer alternative therapies  - Limit or eliminate stimulants  Outcome: Progressing     Problem: DISCHARGE PLANNING - CARE MANAGEMENT  Goal: Discharge to post-acute care or home with appropriate resources  Description: INTERVENTIONS:  - Conduct assessment to determine patient/family and health care team treatment goals, and need for post-acute services based on payer coverage, community resources, and patient preferences, and barriers to discharge  - Address psychosocial, clinical, and financial barriers to discharge as identified in assessment in conjunction with the patient/family and health care team  - Arrange appropriate level of post-acute services according to patient’s   needs and preference and payer coverage in collaboration with the physician and health care team  - Communicate with and update the patient/family, physician, and health care team regarding progress on the discharge plan  - Arrange appropriate transportation to post-acute venues  Outcome: Progressing

## 2023-01-03 NOTE — BH TRANSITION RECORD
Contact Information: If you have any questions, concerns, pended studies, tests and/or procedures, or emergencies regarding your inpatient behavioral health visit  Please contact [facility] behavioral health unit 3B [phone]  and ask to speak to a , nurse or physician  A contact is available 24 hours/ 7 days a week at this number  Summary of Procedures Performed During your Stay:  Below is a list of major procedures performed during your hospital stay and a summary of results:  - Cardiac Procedures/Studies: EKG on 01/01/23 demonstrated normal sinus rhythm with sinus arrhythmia, heart rate 88 beats per minutes, QT/QTc 346/418 ms  Pending Studies (From admission, onward)    None        Please follow up on the above pending studies with your PCP and/or referring provider

## 2023-01-03 NOTE — DISCHARGE INSTR - OTHER ORDERS
CRISIS INFORMATION  If you are experiencing a mental health emergency, you may call the [address] [facility] 24 hours a day, 7 days per week at [phone]  In [facility], call [phone]  When you need someone to listen, the Harvinder Olivo is available for 16 hours a day, 7 days a week, from the time of 7-10am and 2pm-2am   It is not available from the hours of 2am-6am and 10am-2pm  A representative can be reached at [phone]  HOW TO GET SUBSTANCE ABUSE HELP:  If you or someone you know has a drug or alcohol problem, there is help:  [place] [address]: [address]: [phone]  An assessment is the first step  In addition to those listed there are other programs available in the area but assessment is best to determine an appropriate level of care  If you DO NOT have Medical Assistance (MA) or [employer], an assessment can be scheduled at one of these providers:  [address] [facility] [address], [address] [place]  [phone]   [facility]  [address] , [place], [address]  [facility]  [facility] 70  [address] [facility], [facility]   [address] , [place], 98 [facility]  [address] [facility] , [place], 98 [facility]  [address] [facility] , [address], [place], [address]  [phone]     If you [address], an assessment can be scheduled at one of these providers:  [place] [facility]  [address] , [place], 98 [facility]  [address]  [facility] 13, [address] [place]  [address]  D&A Intake Unit  [address] [facility]  [address] , 1st Floor, [facility], [address]  [phone]  [address], [address], [place], [address] [facility] [place] [phone]   [facility]  [address] , [place], [address]  [phone]   [address] [facility]  [address]  [facility]  [phone]   NET ([place])  [address], [address]  197 [facility]  2 [facility], 105 [facility]   [address] , [place], 98 [facility]  [address] [place] , [place], 98 [facility]  [address] , [address], [place], [address]  [phone]     If you [address], an assessment can be scheduled at one of these providers  Please contact these Providers to determine if they are in your network plan:  [facility] D&A Intake Unit  [address] [facility] [address] , 1st Floor, [place] [address]  [address]  [address] , [place], [address]  [phone]   2600 [facility]  [address]  [facility]  [phone]   NET ([place])  [address], [address]  197 [facility]  [address] [place] [address] 17[facility]  [address] [place] [address] , [address] [place], [place], 10 [facility]
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
## 2023-01-03 NOTE — NURSING NOTE
Pt visible on unit in dayroom and room  Denies SI/HI and hallucinations  Pleasant on approach but scant and not forthcoming with information  States he is doing well and "just trying to get out of here tomorrow " Requested and took melatonin for sleep  No behavioral issues  Compliant with unit routines and care

## 2023-01-03 NOTE — NURSING NOTE
Patient awoke and completed all morning routines  Patient is looking forward to their upcoming discharge  Patient was meal complaint this AM, and denies all psych symptoms  All discharge information was discussed with the patient, and patient stated that they will follow through with all discharge plans  Discharge medication was discussed with the patient, and patient stated that they would continue to take their medications as prescribed  All questions were answered at the time of discharge, and contact information was provided to the patient for any questions that may arise after discharge  Patient was discharged to home at 1330, and all patient belongings were returned to the patient at the time of discharge
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
## 2023-01-03 NOTE — DISCHARGE INSTR - APPOINTMENTS
Jenise Lesch or Rae, our Jessica and Sloan, will be calling you after your discharge, on the phone number that you provided  They will be available as an additional support, if needed  If you wish to speak with one of them, you may contact Casey Pearson at [phone] or Felicita Patton at [phone]

## 2023-01-03 NOTE — PLAN OF CARE
Problem: Alteration in Thoughts and Perception  Goal: Verbalize thoughts and feelings  Description: Interventions:  - Promote a nonjudgmental and trusting relationship with the patient through active listening and therapeutic communication  - Assess patient's level of functioning, behavior and potential for risk  - Engage patient in 1 on 1 interactions  - Encourage patient to express fears, feelings, frustrations, and discuss symptoms    - [place] patient to reality, help patient recognize reality-based thinking   - Administer medications as ordered and assess for potential side effects  - Provide the patient education related to the signs and symptoms of the illness and desired effects of prescribed medications  1/3/2023 1130 by Corina Garcia RN  Outcome: Adequate for Discharge  1/3/2023 0920 by Corina Garcia RN  Outcome: Progressing  Goal: Refrain from acting on delusional thinking/internal stimuli  Description: Interventions:  - Monitor patient closely, per order   - Utilize least restrictive measures   - Set reasonable limits, give positive feedback for acceptable   - Administer medications as ordered and monitor of potential side effects  1/3/2023 1130 by Corina Garcia RN  Outcome: Adequate for Discharge  1/3/2023 0920 by Corina Garcia RN  Outcome: Progressing  Goal: Agree to be compliant with medication regime, as prescribed and report medication side effects  Description: Interventions:  - Offer appropriate PRN medication and supervise ingestion; conduct AIMS, as needed   1/3/2023 1130 by Corina Garcia RN  Outcome: Adequate for Discharge  1/3/2023 0920 by Corina Garcia RN  Outcome: Progressing  Goal: Recognize dysfunctional thoughts, communicate reality-based thoughts at the time of discharge  Description: Interventions:  - Provide medication and psycho-education to assist patient in compliance and developing insight into his/her illness   1/3/2023 1130 by Corina Garcia RN  Outcome: Adequate for Discharge  1/3/2023 0920 by Ulysses Greaves, RN  Outcome: Progressing     Problem: Ineffective Coping  Goal: Participates in unit activities  Description: Interventions:  - Provide therapeutic environment   - Provide required programming   - Redirect inappropriate behaviors   Outcome: Adequate for Discharge     Problem: Depression  Goal: Refrain from isolation  Description: Interventions:  - Develop a trusting relationship   - Encourage socialization   1/3/2023 1130 by Ulysses Greaves, RN  Outcome: Adequate for Discharge  1/3/2023 0920 by Ulysses Greaves, RN  Outcome: Progressing  Goal: Refrain from self-neglect  1/3/2023 1130 by Ulysses Greaves, RN  Outcome: Adequate for Discharge  1/3/2023 0920 by Ulysses Greaves, RN  Outcome: Progressing     Problem: Anxiety  Goal: Anxiety is at manageable level  Description: Interventions:  - Assess and monitor patient's anxiety level  - Monitor for signs and symptoms (heart palpitations, chest pain, shortness of breath, headaches, nausea, feeling jumpy, restlessness, irritable, apprehensive)  - Collaborate with interdisciplinary team and initiate plan and interventions as ordered    - [place] patient to unit/surroundings  - Explain treatment plan  - Encourage participation in care  - Encourage verbalization of concerns/fears  - Identify coping mechanisms  - Assist in developing anxiety-reducing skills  - Administer/offer alternative therapies  - Limit or eliminate stimulants  1/3/2023 1130 by Ulysses Greaves, RN  Outcome: Adequate for Discharge  1/3/2023 0920 by Ulysses Greaves, RN  Outcome: Progressing     Problem: DISCHARGE PLANNING - CARE MANAGEMENT  Goal: Discharge to post-acute care or home with appropriate resources  Description: INTERVENTIONS:  - Conduct assessment to determine patient/family and health care team treatment goals, and need for post-acute services based on payer coverage, community resources, and patient preferences, and barriers to discharge  - Address psychosocial, clinical, and financial barriers to discharge as identified in assessment in conjunction with the patient/family and health care team  - Arrange appropriate level of post-acute services according to patient’s   needs and preference and payer coverage in collaboration with the physician and health care team  - Communicate with and update the patient/family, physician, and health care team regarding progress on the discharge plan  - Arrange appropriate transportation to post-acute venues  1/3/2023 1130 by Raman Valdez RN  Outcome: Adequate for Discharge  1/3/2023 0920 by Raman Valdez RN  Outcome: Progressing

## 2023-01-03 NOTE — PLAN OF CARE
LAURA spoke with Pt's mother regarding Pt's admission and discharge  Mom stated she has no concerns relating to Pt's discharge at this time and states she does not feel he needs to remain in the hospital  Mom confirmed that Pt is able to return to her home and confirmed Pt's brother will pick him up upon discharge  LAURA called Tracy and scheduled Pt for guaranteed walk-in appointment on 1/5  Pt is able to walk in between 8am-3pm and will be seen that day for dual mental health and drug and alcohol treatment

## 2023-06-19 ENCOUNTER — OFFICE VISIT (OUTPATIENT)
Dept: URGENT CARE | Age: 28
End: 2023-06-19
Payer: COMMERCIAL

## 2023-06-19 VITALS
RESPIRATION RATE: 16 BRPM | WEIGHT: 141 LBS | DIASTOLIC BLOOD PRESSURE: 58 MMHG | TEMPERATURE: 98.9 F | HEART RATE: 92 BPM | BODY MASS INDEX: 21.13 KG/M2 | SYSTOLIC BLOOD PRESSURE: 107 MMHG | OXYGEN SATURATION: 99 %

## 2023-06-19 DIAGNOSIS — A08.4 VIRAL GASTROENTERITIS: Primary | ICD-10-CM

## 2023-06-19 PROCEDURE — 99283 EMERGENCY DEPT VISIT LOW MDM: CPT

## 2023-06-19 PROCEDURE — G0382 LEV 3 HOSP TYPE B ED VISIT: HCPCS

## 2023-06-19 RX ORDER — DICYCLOMINE HYDROCHLORIDE 10 MG/1
10 CAPSULE ORAL
Qty: 20 CAPSULE | Refills: 0 | Status: SHIPPED | OUTPATIENT
Start: 2023-06-19 | End: 2023-06-24

## 2023-06-19 NOTE — LETTER
June 19, 2023     Patient: Felecia Levine   YOB: 1995   Date of Visit: 6/19/2023       To Whom it May Concern:    Felecia Levine was seen in my clinic on 6/19/2023  He may return to work on 06/20/2023   If you have any questions or concerns, please don't hesitate to call           Sincerely,          NOEL Jones        CC: No Recipients

## 2023-06-19 NOTE — PROGRESS NOTES
"  [facility]        NAME: Chani Yun is a 25 y o  male  : 1995    MRN: [id number]  DATE: 2023  TIME: 6:50 PM    Assessment and Plan   Viral gastroenteritis [A08 4]  1  Viral gastroenteritis  dicyclomine (BENTYL) 10 mg capsule        Discussed with patient to follow-up with PCP regarding refills of medications and for further management of symptoms if no improvement within 3-5 days  Advised patient to report to the ER if symptoms worsen  Patient and wife at beside verbalize understanding and agree to plan  Patient Instructions      Initiate BRAT (banana, rice, applesauce, toast) diet with clear liquids for next 24-48 hours  Take bentyl as directed for abdominal cramping; do not take medication with any other sedating medications (benadryl claritin, zyrtec)  Follow-up with PCP in 3-5 days if no improvement of symptoms  Report to ER if symptoms worsen or unable to tolerate oral intake for greater than 24 hours  Chief Complaint     Chief Complaint   Patient presents with   • Abdominal Pain     Stomach aches x 2 day          History of Present Illness       25year old male presents for evaluation of abdominal pain/cramping with fever (temperature unmeasured, \"felt warm\") that started yesterday  He denies any known sick contacts or triggers but reports that he works in [employer] and had food from the buffet on Saturday that may have contributed to his symptoms  He denies any illicit drug or alcohol use or increased consumption of spicy/acidic foods  He relates 2 episodes of \"soft stools\" since the pain started but denies associated nausea, vomiting, appetite changes, or urinary symptoms  He describes the pain as \"cramping\", interment, and rated 5/10  He denies prior history of abdominal issues or surgeries  He has taken ibuprofen for symptoms with improvement of his fever  He is requesting a note for work and refill on his medications       Abdominal Pain  This is a new " problem  The current episode started yesterday  The onset quality is sudden  The problem occurs intermittently  The problem has been waxing and waning  The pain is located in the suprapubic region  The pain is at a severity of 5/10  The pain is mild  The quality of the pain is cramping  The abdominal pain does not radiate  Associated symptoms include diarrhea and a fever  Pertinent negatives include no anorexia, arthralgias, belching, constipation, dysuria, flatus, frequency, headaches, hematochezia, hematuria, melena, myalgias, nausea, vomiting or weight loss  Nothing aggravates the pain  The pain is relieved by nothing  Treatments tried: ibuprofen  The treatment provided no relief  Review of Systems   Review of Systems   Constitutional: Positive for fever  Negative for activity change, appetite change, chills, fatigue and weight loss  HENT: Negative for congestion, postnasal drip and sore throat  Respiratory: Negative for cough, chest tightness and shortness of breath  Cardiovascular: Negative for chest pain and palpitations  Gastrointestinal: Positive for abdominal pain and diarrhea  Negative for abdominal distention, anorexia, blood in stool, constipation, flatus, hematochezia, melena, nausea and vomiting  Genitourinary: Negative for decreased urine volume, difficulty urinating, dysuria, flank pain, frequency, hematuria, penile discharge, penile pain, testicular pain and urgency  Musculoskeletal: Negative for arthralgias and myalgias  Skin: Negative for color change  Allergic/Immunologic: Negative for environmental allergies and food allergies  Neurological: Negative for dizziness, weakness, light-headedness and headaches           Current Medications       Current Outpatient Medications:   •  albuterol (PROVENTIL HFA,VENTOLIN HFA) 90 mcg/act inhaler, Inhale 2 puffs every 6 (six) hours as needed for wheezing or shortness of breath, Disp: 18 g, Rfl: 0  •  dicyclomine (BENTYL) 10 mg capsule, Take 1 capsule (10 mg total) by mouth 4 (four) times a day (before meals and at bedtime) for 5 days, Disp: 20 capsule, Rfl: 0  •  Loratadine 10 MG CAPS, Take 10 mg by mouth, Disp: , Rfl:     Current Allergies     Allergies as of 06/19/2023   • (No Known Allergies)            The following portions of the patient's history were reviewed and updated as appropriate: allergies, current medications, past family history, past medical history, past social history, past surgical history and problem list      Past Medical History:   Diagnosis Date   • Asthma        History reviewed  No pertinent surgical history  History reviewed  No pertinent family history  Medications have been verified  Objective   /58 (BP Location: Left arm, Patient Position: Sitting, Cuff Size: Adult)   Pulse 92   Temp 98 9 °F (37 2 °C) (Tympanic)   Resp 16   Wt 64 kg (141 lb)   SpO2 99%   BMI 21 13 kg/m²        Physical Exam     Physical Exam  Vitals and nursing note reviewed  Constitutional:       General: He is awake  Appearance: Normal appearance  He is well-developed and normal weight  HENT:      Head: Normocephalic and atraumatic  Right Ear: Hearing, tympanic membrane, ear canal and external ear normal       Left Ear: Hearing, tympanic membrane, ear canal and external ear normal       Nose: No congestion or rhinorrhea  Right Turbinates: Not enlarged, swollen or pale  Left Turbinates: Not enlarged, swollen or pale  Right Sinus: No maxillary sinus tenderness or frontal sinus tenderness  Left Sinus: No maxillary sinus tenderness or frontal sinus tenderness  Mouth/Throat:      Lips: Pink  Mouth: Mucous membranes are moist       Pharynx: Oropharynx is clear  Eyes:      General: Vision grossly intact  Extraocular Movements: Extraocular movements intact  Conjunctiva/sclera: Conjunctivae normal       Pupils: Pupils are equal, round, and reactive to light  Cardiovascular:      Rate and Rhythm: Normal rate and regular rhythm  Pulses: Normal pulses  Heart sounds: Normal heart sounds  Pulmonary:      Effort: Pulmonary effort is normal       Breath sounds: Normal breath sounds  Abdominal:      General: Abdomen is flat  Bowel sounds are normal  There is no distension  Palpations: Abdomen is soft  Tenderness: There is abdominal tenderness in the suprapubic area  There is no right CVA tenderness or left CVA tenderness  Musculoskeletal:      Cervical back: Normal range of motion and neck supple  Skin:     General: Skin is warm and dry  Neurological:      General: No focal deficit present  Mental Status: He is alert and oriented to person, place, and time  Psychiatric:         Mood and Affect: Mood normal          Behavior: Behavior normal  Behavior is cooperative

## 2023-06-19 NOTE — PATIENT INSTRUCTIONS
Initiate BRAT (banana, rice, applesauce, toast) diet with clear liquids for next 24-48 hours  Take bentyl as directed for abdominal cramping; do not take medication with any other sedating medications (benadryl claritin, zyrtec)  Follow-up with PCP in 3-5 days if no improvement of symptoms  Report to ER if symptoms worsen or unable to tolerate oral intake for greater than 24 hours